# Patient Record
Sex: FEMALE | Race: WHITE | NOT HISPANIC OR LATINO | Employment: FULL TIME | URBAN - METROPOLITAN AREA
[De-identification: names, ages, dates, MRNs, and addresses within clinical notes are randomized per-mention and may not be internally consistent; named-entity substitution may affect disease eponyms.]

---

## 2017-06-28 ENCOUNTER — APPOINTMENT (OUTPATIENT)
Dept: WOMENS IMAGING | Facility: HOSPITAL | Age: 52
End: 2017-06-28

## 2017-06-28 PROCEDURE — 77067 SCR MAMMO BI INCL CAD: CPT | Performed by: RADIOLOGY

## 2017-07-06 ENCOUNTER — TRANSCRIBE ORDERS (OUTPATIENT)
Dept: PHYSICAL THERAPY | Facility: HOSPITAL | Age: 52
End: 2017-07-06

## 2017-07-06 DIAGNOSIS — M25.512 PAIN IN JOINT OF LEFT SHOULDER: Primary | ICD-10-CM

## 2017-07-13 ENCOUNTER — HOSPITAL ENCOUNTER (OUTPATIENT)
Dept: PHYSICAL THERAPY | Facility: HOSPITAL | Age: 52
Setting detail: THERAPIES SERIES
Discharge: HOME | End: 2017-07-13

## 2017-07-13 DIAGNOSIS — G89.29 CHRONIC LEFT SHOULDER PAIN: Primary | ICD-10-CM

## 2017-07-13 DIAGNOSIS — Z78.9 DIFFICULTY WITH ACTIVITIES OF DAILY LIVING: ICD-10-CM

## 2017-07-13 DIAGNOSIS — M25.512 CHRONIC LEFT SHOULDER PAIN: Primary | ICD-10-CM

## 2017-07-13 DIAGNOSIS — M75.02 ADHESIVE CAPSULITIS OF LEFT SHOULDER: ICD-10-CM

## 2017-07-13 PROCEDURE — 97110 THERAPEUTIC EXERCISES: CPT

## 2017-07-13 PROCEDURE — 97161 PT EVAL LOW COMPLEX 20 MIN: CPT

## 2017-07-13 NOTE — THERAPY EVALUATION
Outpatient Physical Therapy Ortho Initial Evaluation  [facility]     Patient Name: Justina Mccarty  : 1965  MRN: [id number]  Today's Date: 2017      Visit Date: 2017    There is no problem list on file for this patient.       Past Medical History:   Diagnosis Date   • Osteopenia         History reviewed. No pertinent surgical history.    Visit Dx:     ICD-10-CM ICD-9-CM   1. Chronic left shoulder pain M25.512 719.41    G89.29 338.29   2. Adhesive capsulitis of left shoulder M75.02 726.0   3. Difficulty with activities of daily living R53.81 799.3             Patient History       17 1700          History    Chief Complaint Difficulty with daily activities;Joint stiffness;Pain;Muscle weakness  -CN      Type of Pain Shoulder pain  -CN      Date Current Problem(s) Began 12/13/15  -CN      Brief Description of Current Complaint Pt reports 1.5 year history of L shoulder pain which began after performing wall push ups at the gym. It has been progressively getting worse, and pt reports difficulty reaching and lifting L UE. Pt using compensatory strategies to complete ADLs such as washing hair, donning/doffiing shirt, making bed, unloading dryer and reaching into cabinets.   -CN      Onset Date- PT 17  -CN      Patient/Caregiver Goals Relieve pain;Return to prior level of function;Improve strength;Know what to do to help the symptoms  -CN      Hand Dominance right-handed  -CN      Pain     Pain Location Shoulder  -CN      Pain at Present 2  -CN      Pain at Best 2  -CN      Pain at Worst 9  -CN      Pain Frequency Constant/continuous  -CN      Pain Description Aching;Sharp  -CN      What Performance Factors Make the Current Problem(s) WORSE? Reaching any direction, WBing activities  -CN      What Performance Factors Make the Current Problem(s) BETTER? Stopping aggrivating activites  -CN      Is your sleep disturbed? Yes   every few hours  -CN      Difficulties at work? 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   -CN      Difficulties with ADL's? Yes, donning shirt, pulling covers, shaving arm pits, washing hair, changing sheets, taking dog for walk, putting dishes away, reaching into dryer for laundry  -CN      Difficulties with recreational activities? Yes, unable to do upper body workouts, water skiing  -CN      Fall Risk Assessment    Any falls in the past year: No  -CN      Daily Activities    Primary Language English  -CN      Are you able to read Yes  -CN      Are you able to write Yes  -CN      How does patient learn best? Reading  -CN      Teaching needs identified Home Exercise Program;Management of Condition  -CN      Pt Participated in POC and Goals Yes  -CN      Safety    Are you being hurt, hit, or frightened by anyone at home or in your life? No  -CN      Are you being neglected by a caregiver No  -CN        User Key  (r) = Recorded By, (t) = Taken By, (c) = Cosigned By    Initials Name Provider Type    CN Devi Michele, PT Physical Therapist                PT Ortho       07/13/17 1800    Posture/Observations    Alignment Options Forward head  -CN    Forward Head Mild  -CN    Posture/Observations Comments Mild downward rotation of L scapula  -CN    Sensation    Sensation WNL? WNL  -CN    Shoulder Girdle Palpation    Shoulder Girdle Palpation? --   Gross tenderness about L shoulder  -CN    Shoulder Girdle Accessory Motions    Posterior glide of humerus --  -CN    Inferior glide of humerus Left:;Hypomobile  -CN    Shoulder Impingement/Rotator Cuff Special Tests    Dubois-Jaxson Test (RC Lesion vs. Bursitis) Negative  -CN    Neer Impingement Test (RC Lesion vs. Bursitis) Left:;Positive  -CN    Full Can Test (RC Lesion) Negative  -CN    Empty Can Test (RC Lesion) Negative  -CN    Left Shoulder    Flexion ROM Details 127/132  -CN    ABduction ROM Details 74/76  -CN    External Rotation ROM Details 31/34  -CN     Internal Rotation ROM Details 68/72  -CN    Right Shoulder    Flexion ROM Details 167/175  -CN    ABduction ROM Details 180  -CN    External Rotation ROM Details 85/91  -CN    Internal Rotation ROM Details 80/86  -CN    Left Shoulder    Flexion Gross Movement (3+/5) fair plus  -CN    ABduction Gross Movement (3+/5) fair plus  -CN    Int Rotation Gross Movement (4/5) good  -CN    Ext Rotation Gross Movement (4/5) good  -CN    Right Shoulder    Flexion Gross Movement (4+/5) good plus  -CN    ABduction Gross Movement (4+/5) good plus  -CN    Int Rotation Gross Movement (4+/5) good plus  -CN    Ext Rotation Gross Movement (4+/5) good plus  -CN    Left Elbow/Forearm    Elbow Flexion Gross Movement (4+/5) good plus  -CN    Elbow Extension Gross Movement (4+/5) good plus  -CN    Right Elbow/Forearm    Elbow Flexion Gross Movement (4+/5) good plus  -CN    Elbow Extension Gross Movement (4+/5) good plus  -CN    Upper Extremity Flexibility    Upper Trapezius Bilateral:;Mildly limited  -CN      User Key  (r) = Recorded By, (t) = Taken By, (c) = Cosigned By    Initials Name Provider Type    NAHID Michele, LAURO Physical Therapist                            Therapy Education       07/13/17 1908          Therapy Education    Given HEP;Symptoms/condition management;Pain management;Posture/body mechanics   Eval findings, anatomy, prognosis, goals of PT  -CN      Program New  -CN      How Provided Verbal;Demonstration;Written  -CN      Provided to Patient  -CN      Level of Understanding Teach back education performed;Verbalized;Demonstrated  -CN        User Key  (r) = Recorded By, (t) = Taken By, (c) = Cosigned By    Initials Name Provider Type    NAHID Micehle PT Physical Therapist                PT OP Goals       07/13/17 1900       PT Short Term Goals    STG Date to Achieve 08/03/17  -CN     STG 1 Pt will report subjective pain at 3/10 or less to demonstrate symptom management with ADLs.   -CN     STG 1  Progress New  -CN     STG 2 Pt will be independent and compliant with HEP in order to facilitate self-management of symptoms.   -CN     STG 2 Progress New  -CN     Long Term Goals    LTG Date to Achieve 08/24/17  -CN     LTG 1 Pt will increase shoulder AROM to flex=155, abd=140, ER=75 and IR=75 on left side in order to perform all ADLs.   -CN     LTG 1 Progress New  -CN     LTG 2 Pt will improve gross shoulder strength to 4+/5 on left side.   -CN     LTG 2 Progress New  -CN     LTG 3 Pt will achieve a DASH score of 15% to demonstrate independence with functional activities.  -CN     LTG 3 Progress New  -CN     Time Calculation    PT Goal Re-Cert Due Date 08/13/17  -CN       User Key  (r) = Recorded By, (t) = Taken By, (c) = Cosigned By    Initials Name Provider Type    NAHID Michele, PT Physical Therapist                PT Assessment/Plan       07/13/17 1912       PT Assessment    Functional Limitations Limitations in functional capacity and performance;Limitation in home management;Performance in self-care ADL;Performance in work activities;Performance in leisure activities  -CN     Impairments Impaired flexibility;Muscle strength;Pain;Posture;Range of motion;Joint mobility  -CN     Assessment Comments Pt is a 52 year old female presenting to therapy with c/o L shoulder pain with insidious onset approximately 1.5 years ago. Pt reports pain with shoulder elevation and with reaching behind back. Pt has difficulty with ADLs including donning/doffing shirts, making bed, reaching into cabinets and unloading dryer and symptoms seem to be worsening. Pt demonstrates slight scapular downward rotation on the L, impaired ROM in all planes (see specific joints) and decreased and painful MMT. Empty end feel with ROM secondary to pain preventing full ROM achieved, however inferior GH mobilization is decreased. Neer impingement test was positive and Dubois leonid, empty can and full can tests were negative. Pt  scored 44% on the DASH where 0% is no disability. Pt's signs and symptoms consistent with L adhesive capsulitis vs impingement. Pt would benefit from skilled PT to address the deficits and return to PLOF.   -CN     Please refer to paper survey for additional self-reported information Yes  -CN     Rehab Potential Good  -CN     Patient/caregiver participated in establishment of treatment plan and goals Yes  -CN     Patient would benefit from skilled therapy intervention Yes  -CN     PT Plan    PT Frequency 2x/week  -CN     Predicted Duration of Therapy Intervention (days/wks) 6 weeks  -CN     Planned CPT's? PT EVAL LOW COMPLEXITY: 26423;PT RE-EVAL: 74990;PT THER PROC EA 15 MIN: 59362;PT THER ACT EA 15 MIN: 81898;PT MANUAL THERAPY EA 15 MIN: 18362;PT NEUROMUSC RE-EDUCATION EA 15 MIN: 79900;PT HOT OR COLD PACK TREAT MCARE;PT ELECTRICAL STIM UNATTEND:   -CN     Physical Therapy Interventions (Optional Details) neuromuscular re-education;stretching;home exercise program;joint mobilization;patient/family education;postural re-education;manual therapy techniques;ROM (Range of Motion);strengthening;modalities  -CN     PT Plan Comments PT to treat 2x/week for 6 weeks consisting of ROM, strengthening and flexibility exercsies. Consider UBE warm up, manual ROM, joint mobilizations and scapular strengthening next visit.   -CN       User Key  (r) = Recorded By, (t) = Taken By, (c) = Cosigned By    Initials Name Provider Type    NAHID Michele, PT Physical Therapist                  Exercises       07/13/17 1900          Exercise 1    Exercise Name 1 Supine shoulder flexion  -CN      Cueing 1 Demo  -CN      Reps 1 10  -CN      Time (Seconds) 1 10  -CN      Exercise 2    Exercise Name 2 Shoulder ER with cane  -CN      Cueing 2 Demo;Tactile  -CN      Reps 2 10  -CN      Time (Seconds) 2 10  -CN      Exercise 3    Exercise Name 3 Wall slides (flexion and abduction)  -CN      Cueing 3 Demo  -CN      Reps 3 10  -CN       Time (Seconds) 3 10  -CN        User Key  (r) = Recorded By, (t) = Taken By, (c) = Cosigned By    Initials Name Provider Type    NAHID Michele PT Physical Therapist                              Outcome Measures       07/13/17 1900          Functional Assessment    Outcome Measure Options Disabilities of the Arm, Shoulder, and Hand (DASH)   44% where 0% is no disability  -CN        User Key  (r) = Recorded By, (t) = Taken By, (c) = Cosigned By    Initials Name Provider Type    NAHID Michele PT Physical Therapist            Time Calculation:   Start Time: 1745  Stop Time: 1843  Time Calculation (min): 58 min     Therapy Charges for Today     Code Description Service Date Service Provider Modifiers Qty    42894934862  PT THER PROC EA 15 MIN 7/13/2017 Devi Michele, PT GP 1    [id number]  PT EVAL LOW COMPLEXITY 3 7/13/2017 Devi Michele, PT GP 1          PT G-Codes  Outcome Measure Options: Disabilities of the Arm, Shoulder, and Hand (DASH) (44% where 0% is no disability)         Devi Michele PT  7/13/2017

## 2017-07-19 ENCOUNTER — HOSPITAL ENCOUNTER (OUTPATIENT)
Dept: PHYSICAL THERAPY | Facility: HOSPITAL | Age: 52
Setting detail: THERAPIES SERIES
Discharge: HOME | End: 2017-07-19

## 2017-07-19 DIAGNOSIS — M75.02 ADHESIVE CAPSULITIS OF LEFT SHOULDER: ICD-10-CM

## 2017-07-19 DIAGNOSIS — G89.29 CHRONIC LEFT SHOULDER PAIN: Primary | ICD-10-CM

## 2017-07-19 DIAGNOSIS — Z78.9 DIFFICULTY WITH ACTIVITIES OF DAILY LIVING: ICD-10-CM

## 2017-07-19 DIAGNOSIS — M25.512 CHRONIC LEFT SHOULDER PAIN: Primary | ICD-10-CM

## 2017-07-19 PROCEDURE — 97140 MANUAL THERAPY 1/> REGIONS: CPT

## 2017-07-19 PROCEDURE — 97110 THERAPEUTIC EXERCISES: CPT

## 2017-07-19 NOTE — THERAPY TREATMENT NOTE
Outpatient Physical Therapy Ortho Treatment Note  [facility]     Patient Name: Justina Mccarty  : 1965  MRN: [id number]  Today's Date: 2017      Visit Date: 2017    Visit Dx:    ICD-10-CM ICD-9-CM   1. Chronic left shoulder pain M25.512 719.41    G89.29 338.29   2. Adhesive capsulitis of left shoulder M75.02 726.0   3. Difficulty with activities of daily living R53.81 799.3       There is no problem list on file for this patient.       Past Medical History:   Diagnosis Date   • Osteopenia         No past surgical history on file.                          PT Assessment/Plan       17 1356       PT Assessment    Assessment Comments Pt returns for follow up after evaluation and reports compliance with HEP. Performed manual stretching today which pt able to tolerate with moderate reports of pain. Added IR stretching today as well as retraction exercises which pt will add to HEP. Pt to continue using L UE for ADLs as tolerated.   -CN     PT Plan    PT Plan Comments Consider resisted IR/ER and pulleys next visit. Continue with UBE warm up and mods prn.  -CN       User Key  (r) = Recorded By, (t) = Taken By, (c) = Cosigned By    Initials Name Provider Type    CN Devi Michele, PT Physical Therapist                    Exercises       17 0700          Subjective Comments    Subjective Comments It feels ok today, I have been doing the exercises, but I'm not sure if I am doing them correctly. My daughter has been moving and we have been helping her. I have had trouble lifting heavy things.   -CN      Subjective Pain    Able to rate subjective pain? yes  -CN      Pre-Treatment Pain Level 0  -CN      Exercise 1    Exercise Name 1 Supine shoulder flexion  -CN      Cueing 1 Demo  -CN      Reps 1 10  -CN      Time (Seconds) 1 10  -CN      Exercise 2    Exercise Name 2 Shoulder ER with cane  -CN      Cueing 2 Demo;Tactile  -CN      Reps 2 10  -CN      Time (Seconds) 2 10  -CN      Exercise 4     Exercise Name 4 Supine ER (behind head stretch)  -CN      Cueing 4 Demo  -CN      Reps 4 10  -CN      Time (Seconds) 4 10  -CN      Exercise 5    Exercise Name 5 Rows  -CN      Cueing 5 Demo  -CN      Resistance 5 Red  -CN      Reps 5 10  -CN      Exercise 6    Exercise Name 6 Shoulder extension  -CN      Cueing 6 Demo  -CN      Resistance 6 Red  -CN      Reps 6 10  -CN      Exercise 7    Exercise Name 7 --  -CN      Exercise 8    Exercise Name 8 IR towel stretch  -CN      Cueing 8 Demo  -CN      Reps 8 10  -CN      Time (Seconds) 8 10  -CN        User Key  (r) = Recorded By, (t) = Taken By, (c) = Cosigned By    Initials Name Provider Type    NAHID Michele PT Physical Therapist                        Manual Rx (last 36 hours)      Manual Treatments       07/19/17 0700          Manual Rx 1    Manual Rx 1 Location PROM and inf and posterior mobilizations to L shoulder  -CN      Manual Rx 1 Duration 20 min  -CN        User Key  (r) = Recorded By, (t) = Taken By, (c) = Cosigned By    Initials Name Provider Type    NAHID Michele PT Physical Therapist                PT OP Goals       07/19/17 0700       PT Short Term Goals    STG Date to Achieve 08/03/17  -CN     STG 1 Pt will report subjective pain at 3/10 or less to demonstrate symptom management with ADLs.   -CN     STG 1 Progress Ongoing  -CN     STG 2 Pt will be independent and compliant with HEP in order to facilitate self-management of symptoms.   -CN     STG 2 Progress Ongoing  -CN     STG 2 Progress Comments Pt reports compliance with HEP.   -CN     Long Term Goals    LTG Date to Achieve 08/24/17  -CN     LTG 1 Pt will increase shoulder AROM to flex=155, abd=140, ER=75 and IR=75 on left side in order to perform all ADLs.   -CN     LTG 1 Progress Ongoing  -CN     LTG 2 Pt will improve gross shoulder strength to 4+/5 on left side.   -CN     LTG 2 Progress Ongoing  -CN     LTG 3 Pt will achieve a DASH score of 15% to demonstrate  independence with functional activities.  -CN     LTG 3 Progress Ongoing  -CN       User Key  (r) = Recorded By, (t) = Taken By, (c) = Cosigned By    Initials Name Provider Type    NAHID Michele PT Physical Therapist                Therapy Education       07/19/17 0749          Therapy Education    Given HEP;Symptoms/condition management;Pain management;Posture/body mechanics  -CN      Program Progressed  -CN      How Provided Verbal;Demonstration;Written  -CN      Provided to Patient  -CN      Level of Understanding Teach back education performed;Verbalized;Demonstrated  -CN        User Key  (r) = Recorded By, (t) = Taken By, (c) = Cosigned By    Initials Name Provider Type    NAHID Michele PT Physical Therapist                Time Calculation:   Start Time: 0730  Stop Time: 0815  Time Calculation (min): 45 min    Therapy Charges for Today     Code Description Service Date Service Provider Modifiers Qty    52232027543  PT THER PROC EA 15 MIN 7/19/2017 Devi Michele PT GP 2    [id number]  PT MANUAL THERAPY EA 15 MIN 7/19/2017 Devi Michele PT GP 1                    Devi Michele PT  7/19/2017

## 2017-07-25 ENCOUNTER — APPOINTMENT (OUTPATIENT)
Dept: PHYSICAL THERAPY | Facility: HOSPITAL | Age: 52
End: 2017-07-25

## 2017-07-27 ENCOUNTER — HOSPITAL ENCOUNTER (OUTPATIENT)
Dept: PHYSICAL THERAPY | Facility: HOSPITAL | Age: 52
Setting detail: THERAPIES SERIES
Discharge: HOME | End: 2017-07-27

## 2017-07-27 DIAGNOSIS — Z78.9 DIFFICULTY WITH ACTIVITIES OF DAILY LIVING: ICD-10-CM

## 2017-07-27 DIAGNOSIS — M25.512 CHRONIC LEFT SHOULDER PAIN: Primary | ICD-10-CM

## 2017-07-27 DIAGNOSIS — M75.02 ADHESIVE CAPSULITIS OF LEFT SHOULDER: ICD-10-CM

## 2017-07-27 DIAGNOSIS — G89.29 CHRONIC LEFT SHOULDER PAIN: Primary | ICD-10-CM

## 2017-07-27 PROCEDURE — 97140 MANUAL THERAPY 1/> REGIONS: CPT

## 2017-07-27 PROCEDURE — 97110 THERAPEUTIC EXERCISES: CPT

## 2017-07-27 PROCEDURE — 97112 NEUROMUSCULAR REEDUCATION: CPT

## 2017-07-27 NOTE — THERAPY TREATMENT NOTE
Outpatient Physical Therapy Ortho Treatment Note  [facility]     Patient Name: Justina Mccarty  : 1965  MRN: [id number]  Today's Date: 2017      Visit Date: 2017    Visit Dx:    ICD-10-CM ICD-9-CM   1. Chronic left shoulder pain M25.512 719.41    G89.29 338.29   2. Adhesive capsulitis of left shoulder M75.02 726.0   3. Difficulty with activities of daily living R53.81 799.3       There is no problem list on file for this patient.       Past Medical History:   Diagnosis Date   • Osteopenia         No past surgical history on file.                          PT Assessment/Plan       17 0930       PT Assessment    Assessment Comments Pt reports some improvement with decreasing intensity of pain with ADL's however still demosntrates limited AROM particularly ER and IR.  She has poor awarenessand control of scapula and will benefit from continued scapular stabilization strengthening.    -JA     PT Plan    PT Plan Comments review ER/IR with tband, may add pulleys for Flex and Abd, warm up with UBE  -DOUGLAS       User Key  (r) = Recorded By, (t) = Taken By, (c) = Cosigned By    Initials Name Provider Type    DOUGLAS Nova, PT Physical Therapist                Modalities       17 0800          Ice    Ice Applied No   pt declined, recommended she use if sore later  -DOUGLAS        User Key  (r) = Recorded By, (t) = Taken By, (c) = Cosigned By    Initials Name Provider Type    DOUGLAS Nova, PT Physical Therapist                Exercises       17 0800          Subjective Comments    Subjective Comments i can use my arm to wash my hair now. I can get it a little bit behind my back.  It is popping some. No pain except with certain movements.  -JA      Subjective Pain    Able to rate subjective pain? yes  -JA      Pre-Treatment Pain Level 0  -JA      Post-Treatment Pain Level 1  -JA      Exercise 3    Exercise Name 3 UBE   L3  -JA      Time (Minutes) 3 4  -JA      Exercise 4     Exercise Name 4 Supine ER (behind head stretch)  -JA      Cueing 4 Demo  -JA      Reps 4 10  -JA      Time (Seconds) 4 10  -JA      Exercise 5    Exercise Name 5 Rows  -JA      Cueing 5 Demo  -JA      Reps 5 10  -JA      Resistance 5 Red  -JA      Exercise 6    Exercise Name 6 Shoulder extension  -JA      Cueing 6 Demo  -JA      Reps 6 10  -JA      Resistance 6 Red  -JA      Exercise 7    Exercise Name 7 UBE   L3  -JA      Reps 7 3  -JA      Exercise 8    Exercise Name 8 IR towel stretch  -JA      Cueing 8 Demo  -JA      Reps 8 10  -JA      Time (Seconds) 8 10  -JA      Exercise 9    Exercise Name 9 Shld ext AROM, then rotate internally and bend elbow  -JA      Time (Minutes) 9 3  -JA      Exercise 10    Exercise Name 10 ER and IR with tband   RED  -JA      Cueing 10 Demo  -JA      Reps 10 10  -JA      Exercise 11    Exercise Name 11 Scap ret with straight elbows  -JA      Cueing 11 Tactile  -JA      Reps 11 10  -JA      Exercise 12    Exercise Name 12 Attempted pec stretch in doorway but due to pain did not continue--pt was unable to feel stretch in targeted tissues  -DOUGLAS        User Key  (r) = Recorded By, (t) = Taken By, (c) = Cosigned By    Initials Name Provider Type    DOUGLAS Nova PT Physical Therapist                        Manual Rx (last 36 hours)      Manual Treatments       07/27/17 0915          Manual Rx 1    Manual Rx 1 Location L shoulder, supine  -      Manual Rx 1 Type PROM and inf and posterior mobilizations to L shoulder   Depressed humerus inferiorly during passive flexion   -      Manual Rx 1 Duration 15 min  -DOUGLAS        User Key  (r) = Recorded By, (t) = Taken By, (c) = Cosigned By    Initials Name Provider Type    DOUGLAS Nova PT Physical Therapist                PT OP Goals       07/27/17 0800       PT Short Term Goals    STG Date to Achieve 08/03/17  -DOUGLAS     STG 1 Pt will report subjective pain at 3/10 or less to demonstrate symptom management with ADLs.   -DOUGLAS     STG  1 Progress Progressing  -DOUGLAS     STG 1 Progress Comments reports pain 2/10 with certain movements  -JA     STG 2 Pt will be independent and compliant with HEP in order to facilitate self-management of symptoms.   -JA     STG 2 Progress Met  -JA     STG 2 Progress Comments pt motivated to perform ex's  -DOUGLAS     Long Term Goals    LTG Date to Achieve 08/24/17  -DOUGLAS     LTG 1 Pt will increase shoulder AROM to flex=155, abd=140, ER=75 and IR=75 on left side in order to perform all ADLs.   -DOUGLAS     LTG 1 Progress Ongoing  -DOUGLAS     LTG 1 Progress Comments nt today  -JA     LTG 2 Pt will improve gross shoulder strength to 4+/5 on left side.   -DOUGLAS     LTG 2 Progress Ongoing  -DOUGLAS     LTG 3 Pt will achieve a DASH score of 15% to demonstrate independence with functional activities.  -DOUGLAS     LTG 3 Progress Ongoing  -DOUGLAS       User Key  (r) = Recorded By, (t) = Taken By, (c) = Cosigned By    Initials Name Provider Type    DOUGLAS Nova PT Physical Therapist                Therapy Education       07/27/17 0800          Therapy Education    Education Details Discussed importance of posture and positioning, particularly the scapula and it's stabilizer muscles.  Used tactile cuing for neuromuscular facilitation of L scap into retraction with some depression.  -DOUGLAS      Given HEP;Pain management;Posture/body mechanics  -DOUGLAS      Program Progressed  -DOUGLAS      How Provided Verbal;Demonstration;Written  -DOUGLAS      Provided to Patient  -DOUGLAS      Level of Understanding Teach back education performed  -DOUGLAS        User Key  (r) = Recorded By, (t) = Taken By, (c) = Cosigned By    Initials Name Provider Type    DOUGLAS Nova PT Physical Therapist                Time Calculation:   Start Time: 0830  Stop Time: 0915  Time Calculation (min): 45 min    Therapy Charges for Today     Code Description Service Date Service Provider Modifiers Qty    30041455664 HC PT THER PROC EA 15 MIN 7/27/2017 Nasra Nova PT GP 1    [id number]  PT  NEUROMUSC RE EDUCATION EA 15 MIN 7/27/2017 Nasra Nova, PT GP 1    [id number]  PT MANUAL THERAPY EA 15 MIN 7/27/2017 Nasra Nova, PT GP 1                    Nasra Nova, PT  7/27/2017

## 2017-07-31 ENCOUNTER — HOSPITAL ENCOUNTER (OUTPATIENT)
Dept: PHYSICAL THERAPY | Facility: HOSPITAL | Age: 52
Setting detail: THERAPIES SERIES
Discharge: HOME | End: 2017-07-31

## 2017-07-31 DIAGNOSIS — M75.02 ADHESIVE CAPSULITIS OF LEFT SHOULDER: ICD-10-CM

## 2017-07-31 DIAGNOSIS — Z78.9 DIFFICULTY WITH ACTIVITIES OF DAILY LIVING: ICD-10-CM

## 2017-07-31 DIAGNOSIS — G89.29 CHRONIC LEFT SHOULDER PAIN: Primary | ICD-10-CM

## 2017-07-31 DIAGNOSIS — M25.512 CHRONIC LEFT SHOULDER PAIN: Primary | ICD-10-CM

## 2017-07-31 PROCEDURE — 97110 THERAPEUTIC EXERCISES: CPT

## 2017-07-31 PROCEDURE — 97140 MANUAL THERAPY 1/> REGIONS: CPT

## 2017-08-02 ENCOUNTER — HOSPITAL ENCOUNTER (OUTPATIENT)
Dept: PHYSICAL THERAPY | Facility: HOSPITAL | Age: 52
Setting detail: THERAPIES SERIES
Discharge: HOME | End: 2017-08-02

## 2017-08-02 DIAGNOSIS — G89.29 CHRONIC LEFT SHOULDER PAIN: Primary | ICD-10-CM

## 2017-08-02 DIAGNOSIS — M75.02 ADHESIVE CAPSULITIS OF LEFT SHOULDER: ICD-10-CM

## 2017-08-02 DIAGNOSIS — M25.512 CHRONIC LEFT SHOULDER PAIN: Primary | ICD-10-CM

## 2017-08-02 DIAGNOSIS — Z78.9 DIFFICULTY WITH ACTIVITIES OF DAILY LIVING: ICD-10-CM

## 2017-08-02 PROCEDURE — 97110 THERAPEUTIC EXERCISES: CPT

## 2017-08-02 PROCEDURE — 97140 MANUAL THERAPY 1/> REGIONS: CPT

## 2017-08-02 NOTE — THERAPY TREATMENT NOTE
Outpatient Physical Therapy Ortho Treatment Note  [facility]     Patient Name: Justina Mccarty  : 1965  MRN: [id number]  Today's Date: 2017      Visit Date: 2017    Visit Dx:    ICD-10-CM ICD-9-CM   1. Chronic left shoulder pain M25.512 719.41    G89.29 338.29   2. Adhesive capsulitis of left shoulder M75.02 726.0   3. Difficulty with activities of daily living R53.81 799.3       There is no problem list on file for this patient.       Past Medical History:   Diagnosis Date   • Osteopenia         No past surgical history on file.                          PT Assessment/Plan       17 1519       PT Assessment    Assessment Comments Pt demonstrates improvements in gross shoulder ROM, however continue with greatest limitations into abduction and ER. Pt going on a trip this weekend and advised to continue with stretches and retraction exercises as able.   -CN     PT Plan    PT Plan Comments Assess tolerance to traveling and progress RTC strengthening as tolerated.   -CN       User Key  (r) = Recorded By, (t) = Taken By, (c) = Cosigned By    Initials Name Provider Type    NAHID Michele, PT Physical Therapist                    Exercises       17 0900          Subjective Comments    Subjective Comments I have been having a little more soreness over the past few days. I can tell its getting better.   -CN      Subjective Pain    Able to rate subjective pain? yes  -CN      Pre-Treatment Pain Level 2  -CN      Post-Treatment Pain Level 2   soreness  -CN      Exercise 1    Exercise Name 1 Pulleys into flexion and abduction  -CN      Cueing 1 Demo  -CN      Reps 1 15  -CN      Time (Seconds) 1 5  -CN      Exercise 2    Exercise Name 2 Shoulder ER with cane  -CN      Cueing 2 Demo;Tactile  -CN      Reps 2 10  -CN      Time (Seconds) 2 10  -CN      Exercise 3    Exercise Name 3 UBE  -CN      Time (Minutes) 3 4  -CN      Exercise 4    Exercise Name 4 Supine ER (behind head stretch)  -CN       Cueing 4 Demo  -CN      Reps 4 10  -CN      Time (Seconds) 4 10  -CN      Exercise 13    Exercise Name 13 Supine punches  -CN      Cueing 13 Demo  -CN      Sets 13 2  -CN      Reps 13 10  -CN      Additional Comments 2#  -CN      Exercise 14    Exercise Name 14 SL abduction  -CN      Cueing 14 Demo  -CN      Reps 14 10  -CN      Additional Comments 1#  -CN      Exercise 15    Exercise Name 15 SL ER  -CN      Cueing 15 Demo  -CN      Sets 15 2  -CN      Reps 15 10  -CN      Additional Comments 1#  -CN        User Key  (r) = Recorded By, (t) = Taken By, (c) = Cosigned By    Initials Name Provider Type    NAHID Michele PT Physical Therapist                        Manual Rx (last 36 hours)      Manual Treatments       08/02/17 0900          Manual Rx 1    Manual Rx 1 Location L shoulder, supine  -CN      Manual Rx 1 Type PROM and inf and posterior mobilizations to L shoulder  -CN      Manual Rx 1 Duration 15 min  -CN        User Key  (r) = Recorded By, (t) = Taken By, (c) = Cosigned By    Initials Name Provider Type    NAHID Michele PT Physical Therapist                    Therapy Education       08/02/17 0933          Therapy Education    Given HEP;Pain management;Posture/body mechanics  -CN      Program Progressed  -CN      How Provided Verbal;Demonstration  -CN      Provided to Patient  -CN      Level of Understanding Teach back education performed  -CN        User Key  (r) = Recorded By, (t) = Taken By, (c) = Cosigned By    Initials Name Provider Type    NAHID Michele PT Physical Therapist                Time Calculation:   Start Time: 0900  Stop Time: 0945  Time Calculation (min): 45 min    Therapy Charges for Today     Code Description Service Date Service Provider Modifiers Qty    33484220817  PT THER PROC EA 15 MIN 8/2/2017 Devi Michele PT GP 2    30611985175 HC PT MANUAL THERAPY EA 15 MIN 8/2/2017 Devi Michele PT GP 1                    Devi  Mike Michele, PT  8/2/2017

## 2017-08-08 ENCOUNTER — HOSPITAL ENCOUNTER (OUTPATIENT)
Dept: PHYSICAL THERAPY | Facility: HOSPITAL | Age: 52
Setting detail: THERAPIES SERIES
Discharge: HOME | End: 2017-08-08

## 2017-08-08 DIAGNOSIS — Z78.9 DIFFICULTY WITH ACTIVITIES OF DAILY LIVING: ICD-10-CM

## 2017-08-08 DIAGNOSIS — M75.02 ADHESIVE CAPSULITIS OF LEFT SHOULDER: ICD-10-CM

## 2017-08-08 DIAGNOSIS — M25.512 CHRONIC LEFT SHOULDER PAIN: Primary | ICD-10-CM

## 2017-08-08 DIAGNOSIS — G89.29 CHRONIC LEFT SHOULDER PAIN: Primary | ICD-10-CM

## 2017-08-08 PROCEDURE — 97110 THERAPEUTIC EXERCISES: CPT

## 2017-08-08 PROCEDURE — 97140 MANUAL THERAPY 1/> REGIONS: CPT

## 2017-08-08 NOTE — THERAPY TREATMENT NOTE
Outpatient Physical Therapy Ortho Treatment Note  [facility]     Patient Name: Justina Mccarty  : 1965  MRN: [id number]  Today's Date: 2017      Visit Date: 2017    Visit Dx:    ICD-10-CM ICD-9-CM   1. Chronic left shoulder pain M25.512 719.41    G89.29 338.29   2. Adhesive capsulitis of left shoulder M75.02 726.0   3. Difficulty with activities of daily living R53.81 799.3       There is no problem list on file for this patient.       Past Medical History:   Diagnosis Date   • Osteopenia         No past surgical history on file.                          PT Assessment/Plan       17 1409       PT Assessment    Assessment Comments Pt continues to have difficulty with abduction and ER ROM, both passive and active. Added rhythmic stabilization today to increase scapular stabilizers and pt demonstrates fatigue after 30 sec x2 with rest break. Pt's shoulder musculature endurance is impaired and advised to continue with HEP to improve strength and stamina.   -CN     PT Plan    PT Plan Comments Continue to progress stabilization activities as tolerated.   -CN       User Key  (r) = Recorded By, (t) = Taken By, (c) = Cosigned By    Initials Name Provider Type    CN Devi Michele, PT Physical Therapist                    Exercises       17 0800          Subjective Comments    Subjective Comments I tried to do my exercises over the weekend but I didn't do them every day. I noticed on the days I didn't do them that I didn't sleep as well.   -CN      Subjective Pain    Able to rate subjective pain? yes  -CN      Pre-Treatment Pain Level 1  -CN      Exercise 1    Exercise Name 1 Pulleys into flexion and abduction  -CN      Cueing 1 Demo  -CN      Reps 1 15  -CN      Time (Seconds) 1 5  -CN      Exercise 2    Exercise Name 2 Shoulder ER with cane  -CN      Cueing 2 Demo;Tactile  -CN      Reps 2 10  -CN      Time (Seconds) 2 10  -CN      Exercise 3    Exercise Name 3 UBE  -CN      Time  (Minutes) 3 4  -CN      Exercise 4    Exercise Name 4 Supine ER (behind head stretch)  -CN      Cueing 4 Demo  -CN      Reps 4 10  -CN      Time (Seconds) 4 10  -CN      Exercise 13    Exercise Name 13 Supine punches  -CN      Cueing 13 Demo  -CN      Sets 13 2  -CN      Reps 13 10  -CN      Additional Comments 2#  -CN      Exercise 14    Exercise Name 14 SL abduction  -CN      Cueing 14 Demo  -CN      Sets 14 2  -CN      Reps 14 10  -CN      Additional Comments 1#  -CN      Exercise 15    Exercise Name 15 SL ER  -CN      Cueing 15 Demo  -CN      Sets 15 2  -CN      Reps 15 10  -CN      Additional Comments 1#  -CN        User Key  (r) = Recorded By, (t) = Taken By, (c) = Cosigned By    Initials Name Provider Type    NAHID Michele, PT Physical Therapist                        Manual Rx (last 36 hours)      Manual Treatments       08/08/17 0700          Manual Rx 1    Manual Rx 1 Location L shoulder, supine  -CN      Manual Rx 1 Type PROM and inf and posterior mobilizations to L shoulder  -CN      Manual Rx 2    Manual Rx 2 Location Rhythmic stabilization, pt in HL  -CN      Manual Rx 2 Duration 20 min  -CN        User Key  (r) = Recorded By, (t) = Taken By, (c) = Cosigned By    Initials Name Provider Type    NAHID Michele PT Physical Therapist                PT OP Goals       08/08/17 0900       PT Short Term Goals    STG Date to Achieve 08/03/17  -CN     STG 1 Pt will report subjective pain at 3/10 or less to demonstrate symptom management with ADLs.   -CN     STG 1 Progress Progressing  -CN     STG 1 Progress Comments Up to a 4/10 with exercises.   -CN     STG 2 Pt will be independent and compliant with HEP in order to facilitate self-management of symptoms.   -CN     STG 2 Progress Met  -CN     Long Term Goals    LTG Date to Achieve 08/24/17  -CN     LTG 1 Pt will increase shoulder AROM to flex=155, abd=140, ER=75 and IR=75 on left side in order to perform all ADLs.   -CN     LTG 1  Progress Ongoing  -CN     LTG 2 Pt will improve gross shoulder strength to 4+/5 on left side.   -CN     LTG 2 Progress Ongoing  -CN     LTG 3 Pt will achieve a DASH score of 15% to demonstrate independence with functional activities.  -CN     LTG 3 Progress Ongoing  -CN       User Key  (r) = Recorded By, (t) = Taken By, (c) = Cosigned By    Initials Name Provider Type    NAHID Michele PT Physical Therapist                Therapy Education       08/08/17 0859          Therapy Education    Given HEP;Pain management;Posture/body mechanics  -CN      Program Progressed  -CN      How Provided Verbal;Demonstration  -CN      Provided to Patient  -CN      Level of Understanding Teach back education performed  -CN        User Key  (r) = Recorded By, (t) = Taken By, (c) = Cosigned By    Initials Name Provider Type    NAHID Michele PT Physical Therapist                Time Calculation:   Start Time: 0830  Stop Time: 0915  Time Calculation (min): 45 min    Therapy Charges for Today     Code Description Service Date Service Provider Modifiers Qty    39994080020  PT THER PROC EA 15 MIN 8/8/2017 Devi Michele PT GP 2    [id number]  PT MANUAL THERAPY EA 15 MIN 8/8/2017 Devi Michele PT GP 1                    Devi Michele PT  8/8/2017

## 2017-08-10 ENCOUNTER — HOSPITAL ENCOUNTER (OUTPATIENT)
Dept: PHYSICAL THERAPY | Facility: HOSPITAL | Age: 52
Setting detail: THERAPIES SERIES
Discharge: HOME | End: 2017-08-10

## 2017-08-10 DIAGNOSIS — M25.512 CHRONIC LEFT SHOULDER PAIN: Primary | ICD-10-CM

## 2017-08-10 DIAGNOSIS — G89.29 CHRONIC LEFT SHOULDER PAIN: Primary | ICD-10-CM

## 2017-08-10 DIAGNOSIS — M75.02 ADHESIVE CAPSULITIS OF LEFT SHOULDER: ICD-10-CM

## 2017-08-10 DIAGNOSIS — Z78.9 DIFFICULTY WITH ACTIVITIES OF DAILY LIVING: ICD-10-CM

## 2017-08-10 PROCEDURE — 97110 THERAPEUTIC EXERCISES: CPT

## 2017-08-10 PROCEDURE — 97140 MANUAL THERAPY 1/> REGIONS: CPT

## 2017-08-10 NOTE — THERAPY TREATMENT NOTE
Outpatient Physical Therapy Ortho Treatment Note  [facility]     Patient Name: Justina Mccarty  : 1965  MRN: [id number]  Today's Date: 8/10/2017      Visit Date: 08/10/2017    Visit Dx:    ICD-10-CM ICD-9-CM   1. Chronic left shoulder pain M25.512 719.41    G89.29 338.29   2. Adhesive capsulitis of left shoulder M75.02 726.0   3. Difficulty with activities of daily living R53.81 799.3       There is no problem list on file for this patient.       Past Medical History:   Diagnosis Date   • Osteopenia         No past surgical history on file.                          PT Assessment/Plan       08/10/17 1917       PT Assessment    Assessment Comments Pt reports stiffness today due to trip earlier this week. ROM continues to improve and added supine ABCs and ball rolls on wall which pt was able to tolerate with mild soreness. Pt to continue using L UE for ADLs as tolerated, however keeping pain at manageable level.   -CN     PT Plan    PT Plan Comments Reeval next visit. Continue with current POC.   -CN       User Key  (r) = Recorded By, (t) = Taken By, (c) = Cosigned By    Initials Name Provider Type    CN Devi Michele, PT Physical Therapist                    Exercises       08/10/17 1500          Subjective Comments    Subjective Comments It is aching pretty bad today. [facility] [place] on Tuesday and it was hurting in the car.   -CN      Subjective Pain    Able to rate subjective pain? yes  -CN      Pre-Treatment Pain Level 2  -CN      Post-Treatment Pain Level 3  -CN      Exercise 1    Exercise Name 1 Pulleys into flexion and abduction  -CN      Cueing 1 Demo  -CN      Reps 1 15  -CN      Time (Seconds) 1 5  -CN      Exercise 3    Exercise Name 3 UBE  -CN      Time (Minutes) 3 4  -CN      Exercise 7    Exercise Name 7 UBE  -CN      Reps 7 4  -CN      Exercise 8    Exercise Name 8 IR towel stretch  -CN      Cueing 8 Demo  -CN      Reps 8 10  -CN      Time (Seconds) 8 10  -CN      Exercise  12    Exercise Name 12 Supine ABCs  -CN      Cueing 12 Demo  -CN      Reps 12 1  -CN      Additional Comments 2#  -CN      Exercise 16    Exercise Name 16 Wall slides into flexion and abduction  -CN      Cueing 16 Demo  -CN      Reps 16 10  -CN      Time (Seconds) 16 10  -CN      Exercise 17    Exercise Name 17 Ball rolls on wall with punch  -CN      Cueing 17 Demo  -CN      Reps 17 10  -CN      Additional Comments 3# ball  -CN        User Key  (r) = Recorded By, (t) = Taken By, (c) = Cosigned By    Initials Name Provider Type    NAHID Michele PT Physical Therapist                        Manual Rx (last 36 hours)      Manual Treatments       08/10/17 1500          Manual Rx 1    Manual Rx 1 Location L shoulder, supine  -CN      Manual Rx 1 Type PROM and inf and posterior mobilizations to L shoulder  -CN      Manual Rx 2    Manual Rx 2 Location Rhythmic stabilization, pt in HL  -CN      Manual Rx 2 Duration 20 min  -CN        User Key  (r) = Recorded By, (t) = Taken By, (c) = Cosigned By    Initials Name Provider Type    NAHID Michele PT Physical Therapist                    Therapy Education       08/10/17 1540          Therapy Education    Given HEP;Pain management;Posture/body mechanics  -CN      Program Progressed  -CN      How Provided Verbal;Demonstration  -CN      Provided to Patient  -CN      Level of Understanding Teach back education performed  -CN        User Key  (r) = Recorded By, (t) = Taken By, (c) = Cosigned By    Initials Name Provider Type    NAHID Michele PT Physical Therapist                Time Calculation:   Start Time: 1530  Stop Time: 1615  Time Calculation (min): 45 min    Therapy Charges for Today     Code Description Service Date Service Provider Modifiers Qty    06496966543  PT THER PROC EA 15 MIN 8/10/2017 Devi Michele PT GP 2    94993582873 HC PT MANUAL THERAPY EA 15 MIN 8/10/2017 Devi Michele PT GP 1                     Devi Michele, PT  8/10/2017

## 2017-08-15 ENCOUNTER — HOSPITAL ENCOUNTER (OUTPATIENT)
Dept: PHYSICAL THERAPY | Facility: HOSPITAL | Age: 52
Setting detail: THERAPIES SERIES
Discharge: HOME | End: 2017-08-15

## 2017-08-15 DIAGNOSIS — M75.02 ADHESIVE CAPSULITIS OF LEFT SHOULDER: ICD-10-CM

## 2017-08-15 DIAGNOSIS — G89.29 CHRONIC LEFT SHOULDER PAIN: Primary | ICD-10-CM

## 2017-08-15 DIAGNOSIS — Z78.9 DIFFICULTY WITH ACTIVITIES OF DAILY LIVING: ICD-10-CM

## 2017-08-15 DIAGNOSIS — M25.512 CHRONIC LEFT SHOULDER PAIN: Primary | ICD-10-CM

## 2017-08-15 PROCEDURE — 97140 MANUAL THERAPY 1/> REGIONS: CPT

## 2017-08-15 PROCEDURE — 97110 THERAPEUTIC EXERCISES: CPT

## 2017-08-15 NOTE — THERAPY PROGRESS REPORT/RE-CERT
Outpatient Physical Therapy Ortho Re-Assessment  [facility]     Patient Name: Justina Mccarty  : 1965  MRN: [id number]  Today's Date: 8/15/2017      Visit Date: 08/15/2017    There is no problem list on file for this patient.       Past Medical History:   Diagnosis Date   • Osteopenia         No past surgical history on file.    Visit Dx:     ICD-10-CM ICD-9-CM   1. Chronic left shoulder pain M25.512 719.41    G89.29 338.29   2. Adhesive capsulitis of left shoulder M75.02 726.0   3. Difficulty with activities of daily living R53.81 799.3                 PT Ortho       08/15/17 1700    Left Shoulder    Flexion ROM Details 136/139  -CN    ABduction ROM Details 94/98  -CN    External Rotation ROM Details 43/46  -CN    Internal Rotation ROM Details 68/73  -CN    Left Shoulder    Flexion Gross Movement (4/5) good  -CN    ABduction Gross Movement (4/5) good  -CN    Int Rotation Gross Movement (4/5) good  -CN    Ext Rotation Gross Movement (4/5) good  -CN    Right Shoulder    Flexion Gross Movement (4+/5) good plus  -CN    ABduction Gross Movement (4+/5) good plus  -CN    Int Rotation Gross Movement (4+/5) good plus  -CN    Ext Rotation Gross Movement (4+/5) good plus  -CN      User Key  (r) = Recorded By, (t) = Taken By, (c) = Cosigned By    Initials Name Provider Type    NAHID Michele, PT Physical Therapist                            Therapy Education       08/15/17 1750          Therapy Education    Given HEP;Pain management;Posture/body mechanics  -CN      Program Reinforced  -CN      How Provided Verbal;Demonstration  -CN      Provided to Patient  -CN      Level of Understanding Teach back education performed  -CN        User Key  (r) = Recorded By, (t) = Taken By, (c) = Cosigned By    Initials Name Provider Type    NAHID Michele, PT Physical Therapist                PT OP Goals       08/15/17 1700       PT Short Term Goals    STG Date to Achieve 17  -CN     STG 1 Pt will  report subjective pain at 3/10 or less to demonstrate symptom management with ADLs.   -CN     STG 1 Progress Progressing  -CN     STG 1 Progress Comments Pain reaches 3-4/10 with certain movements.   -CN     STG 2 Pt will be independent and compliant with HEP in order to facilitate self-management of symptoms.   -CN     STG 2 Progress Met  -CN     Long Term Goals    LTG Date to Achieve 08/24/17  -CN     LTG 1 Pt will increase shoulder AROM to flex=155, abd=140, ER=75 and IR=75 on left side in order to perform all ADLs.   -CN     LTG 1 Progress Progressing  -CN     LTG 1 Progress Comments Pt's ROM improving, however continues with limitations most significantly into abduction and ER.   -CN     LTG 2 Pt will improve gross shoulder strength to 4+/5 on left side.   -CN     LTG 2 Progress Progressing  -CN     LTG 2 Progress Comments Pt's strength rated 4/5 on the L.   -CN     LTG 3 Pt will achieve a DASH score of 15% to demonstrate independence with functional activities.  -CN     LTG 3 Progress Progressing  -CN     LTG 3 Progress Comments Pt scored 39% on the DASH where 0% is no disability.   -CN       User Key  (r) = Recorded By, (t) = Taken By, (c) = Cosigned By    Initials Name Provider Type    NAHID Michele, PT Physical Therapist                PT Assessment/Plan       08/15/17 5894       PT Assessment    Functional Limitations Limitations in functional capacity and performance;Limitation in home management;Performance in self-care ADL;Performance in work activities;Performance in leisure activities  -CN     Impairments Impaired flexibility;Muscle strength;Pain;Posture;Range of motion;Joint mobility  -CN     Assessment Comments Pt has attended 8 skilled therapy sessions for treatment of L shoulder pain. Pt has made progress toward goals, specifically with regard to increased ROM and strength in L shoulder, however deficits still remain in both areas. Pt notes improvement in ROM with functional activities  such as reaching into dryer to gather clothes and carry groceries. Pt continues to have difficulty donning coat and fastening seat belt and requires slow, controlled movements to decrease exacerbation of shoulder. Pt scored 39% on the DASH where 0% is no disability. Pt would benefit from continued skilled PT to address remaining deficits and return to PLOF.   -CN     Please refer to paper survey for additional self-reported information Yes  -CN     Rehab Potential Good  -CN     Patient/caregiver participated in establishment of treatment plan and goals Yes  -CN     Patient would benefit from skilled therapy intervention Yes  -CN     PT Plan    PT Frequency 2x/week  -CN     Predicted Duration of Therapy Intervention (days/wks) 4 weeks  -CN     Planned CPT's? PT RE-EVAL: 68920;PT THER PROC EA 15 MIN: 49763;PT THER ACT EA 15 MIN: 46928;PT MANUAL THERAPY EA 15 MIN: 39082;PT NEUROMUSC RE-EDUCATION EA 15 MIN: 41733;PT GAIT TRAINING EA 15 MIN: 96075;PT HOT OR COLD PACK TREAT MCARE;PT ELECTRICAL STIM UNATTEND: ;PT ULTRASOUND EA 15 MIN: 07178  -CN     Physical Therapy Interventions (Optional Details) neuromuscular re-education;stretching;home exercise program;joint mobilization;patient/family education;postural re-education;manual therapy techniques;ROM (Range of Motion);modalities;strengthening  -CN     PT Plan Comments PT to continue treating 2x/week for 4 weeks consisting of ROM, strengthening and stability exercises. Consider prone Ys and Ts next visit.   -CN       User Key  (r) = Recorded By, (t) = Taken By, (c) = Cosigned By    Initials Name Provider Type    CN Devi Michele, PT Physical Therapist                  Exercises       08/15/17 1700          Subjective Comments    Subjective Comments It was stiff yesterday but felt a lot better after I stretched it.   -CN      Subjective Pain    Able to rate subjective pain? yes  -CN      Pre-Treatment Pain Level 2  -CN      Exercise 3    Exercise Name 3 UBE   -CN      Time (Minutes) 3 4  -CN      Exercise 8    Exercise Name 8 IR towel stretch  -CN      Cueing 8 Demo  -CN      Reps 8 10  -CN      Time (Seconds) 8 10  -CN      Exercise 12    Exercise Name 12 Supine ABCs  -CN      Cueing 12 Demo  -CN      Reps 12 1  -CN      Additional Comments 2#  -CN      Exercise 13    Exercise Name 13 Supine punches  -CN      Cueing 13 Demo  -CN      Sets 13 2  -CN      Reps 13 10  -CN        User Key  (r) = Recorded By, (t) = Taken By, (c) = Cosigned By    Initials Name Provider Type    NAHID Michele PT Physical Therapist           Manual Rx (last 36 hours)      Manual Treatments       08/15/17 1700          Manual Rx 1    Manual Rx 1 Location L shoulder, supine  -CN      Manual Rx 1 Type PROM and inf and posterior mobilizations to L shoulder  -CN      Manual Rx 2    Manual Rx 2 Location Rhythmic stabilization, pt in HL  -CN      Manual Rx 2 Duration 20 min  -CN        User Key  (r) = Recorded By, (t) = Taken By, (c) = Cosigned By    Initials Name Provider Type    NAHID Michele PT Physical Therapist                            Outcome Measures       08/15/17 1900          Functional Assessment    Outcome Measure Options Disabilities of the Arm, Shoulder, and Hand (DASH)   39% where 0% is no disability  -CN        User Key  (r) = Recorded By, (t) = Taken By, (c) = Cosigned By    Initials Name Provider Type    NAHID Michele PT Physical Therapist            Time Calculation:   Start Time: 1715  Stop Time: 1800  Time Calculation (min): 45 min     Therapy Charges for Today     Code Description Service Date Service Provider Modifiers Qty    31686839664 HC PT THER PROC EA 15 MIN 8/15/2017 Devi Michele, PT GP 2    78535925909 HC PT MANUAL THERAPY EA 15 MIN 8/15/2017 Devi Michele, PT GP 1          PT G-Codes  Outcome Measure Options: Disabilities of the Arm, Shoulder, and Hand (DASH) (39% where 0% is no disability)         Devi Mckeon  Leny, PT  8/15/2017

## 2017-08-17 ENCOUNTER — HOSPITAL ENCOUNTER (OUTPATIENT)
Dept: PHYSICAL THERAPY | Facility: HOSPITAL | Age: 52
Setting detail: THERAPIES SERIES
Discharge: HOME | End: 2017-08-17

## 2017-08-17 DIAGNOSIS — M75.02 ADHESIVE CAPSULITIS OF LEFT SHOULDER: ICD-10-CM

## 2017-08-17 DIAGNOSIS — Z78.9 DIFFICULTY WITH ACTIVITIES OF DAILY LIVING: ICD-10-CM

## 2017-08-17 DIAGNOSIS — G89.29 CHRONIC LEFT SHOULDER PAIN: Primary | ICD-10-CM

## 2017-08-17 DIAGNOSIS — M25.512 CHRONIC LEFT SHOULDER PAIN: Primary | ICD-10-CM

## 2017-08-17 PROCEDURE — 97110 THERAPEUTIC EXERCISES: CPT

## 2017-08-17 PROCEDURE — 97140 MANUAL THERAPY 1/> REGIONS: CPT

## 2017-08-17 NOTE — THERAPY TREATMENT NOTE
Outpatient Physical Therapy Ortho Treatment Note  [facility]     Patient Name: Justina Mccarty  : 1965  MRN: [id number]  Today's Date: 2017      Visit Date: 2017    Visit Dx:    ICD-10-CM ICD-9-CM   1. Chronic left shoulder pain M25.512 719.41    G89.29 338.29   2. Adhesive capsulitis of left shoulder M75.02 726.0   3. Difficulty with activities of daily living R53.81 799.3       There is no problem list on file for this patient.       Past Medical History:   Diagnosis Date   • Osteopenia         No past surgical history on file.          PT Ortho       08/15/17 1700    Left Shoulder    Flexion ROM Details 136/139  -CN    ABduction ROM Details 94/98  -CN    External Rotation ROM Details 43/46  -CN    Internal Rotation ROM Details 68/73  -CN    Left Shoulder    Flexion Gross Movement (4/5) good  -CN    ABduction Gross Movement (4/5) good  -CN    Int Rotation Gross Movement (4/5) good  -CN    Ext Rotation Gross Movement (4/5) good  -CN    Right Shoulder    Flexion Gross Movement (4+/5) good plus  -CN    ABduction Gross Movement (4+/5) good plus  -CN    Int Rotation Gross Movement (4+/5) good plus  -CN    Ext Rotation Gross Movement (4+/5) good plus  -CN      User Key  (r) = Recorded By, (t) = Taken By, (c) = Cosigned By    Initials Name Provider Type    NAHID Michele, PT Physical Therapist                            PT Assessment/Plan       17 1700       PT Assessment    Assessment Comments Pt demonstrates significant weakness in retractors with prone exercises and updated handouts to include in home program. Middle and lower trap muscles weakness impair shoulder stability with elevatioin and likely causing symptoms of impingement.   -CN     PT Plan    PT Plan Comments Assess response to prone exercsies and continue with scapular stabilization exercises.   -CN       User Key  (r) = Recorded By, (t) = Taken By, (c) = Cosigned By    Initials Name Provider Type    NAHID Mckeon  Leny, LAURO Physical Therapist                    Exercises       08/17/17 1600          Subjective Comments    Subjective Comments It isn't really hurting today, just a little sore. 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   -CN      Subjective Pain    Able to rate subjective pain? yes  -CN      Pre-Treatment Pain Level 0  -CN      Exercise 1    Exercise Name 1 Pulleys into flexion and abduction  -CN      Cueing 1 Demo  -CN      Reps 1 15  -CN      Time (Seconds) 1 5  -CN      Exercise 2    Exercise Name 2 Supine flexion with cane  -CN      Cueing 2 Demo  -CN      Reps 2 10  -CN      Additional Comments 2#  -CN      Exercise 3    Exercise Name 3 UBE  -CN      Time (Minutes) 3 4  -CN      Exercise 5    Exercise Name 5 Supine press ups  -CN      Cueing 5 Demo  -CN      Sets 5 2  -CN      Reps 5 10  -CN      Additional Comments 2#  -CN      Exercise 6    Exercise Name 6 Prone shoulder extension  -CN      Cueing 6 Demo  -CN      Reps 6 10  -CN      Exercise 7    Exercise Name 7 Prone Ys and Ts  -CN      Cueing 7 Demo  -CN      Sets 7 2  -CN      Reps 7 10  -CN      Exercise 9    Exercise Name 9 Prone rows  -CN      Cueing 9 Demo  -CN      Reps 9 10  -CN      Exercise 12    Exercise Name 12 Supine ABCs  -CN      Cueing 12 Demo  -CN      Reps 12 1  -CN      Additional Comments 2#  -CN        User Key  (r) = Recorded By, (t) = Taken By, (c) = Cosigned By    Initials Name Provider Type    CN Devi Michele, LAURO Physical Therapist                        Manual Rx (last 36 hours)      Manual Treatments       08/17/17 1500          Manual Rx 1    Manual Rx 1 Location L shoulder, supine  -CN      Manual Rx 1 Type PROM and inf and posterior mobilizations to L shoulder  -CN      Manual Rx 1 Duration 15 min  -CN        User Key  (r) = Recorded By, (t) = Taken By, (c) = Cosigned By    Initials Name Provider Type    NAHID Michele PT Physical Therapist                     Therapy Education       08/17/17 1649          Therapy Education    Given HEP;Pain management;Posture/body mechanics  -CN      Program Reinforced  -CN      How Provided Verbal;Demonstration  -CN      Provided to Patient  -CN      Level of Understanding Teach back education performed  -CN        User Key  (r) = Recorded By, (t) = Taken By, (c) = Cosigned By    Initials Name Provider Type    NAHID Michele, LAURO Physical Therapist                Outcome Measures       08/15/17 1900          Functional Assessment    Outcome Measure Options Disabilities of the Arm, Shoulder, and Hand (DASH)   39% where 0% is no disability  -CN        User Key  (r) = Recorded By, (t) = Taken By, (c) = Cosigned By    Initials Name Provider Type    NAHID Michele PT Physical Therapist            Time Calculation:   Start Time: 1615  Stop Time: 1700  Time Calculation (min): 45 min    Therapy Charges for Today     Code Description Service Date Service Provider Modifiers Qty    70579652866 HC PT THER PROC EA 15 MIN 8/17/2017 Devi Michele, PT GP 2    [id number]  PT MANUAL THERAPY EA 15 MIN 8/17/2017 Devi Michele PT GP 1                    Devi Michele PT  8/17/2017

## 2017-08-21 ENCOUNTER — HOSPITAL ENCOUNTER (OUTPATIENT)
Dept: PHYSICAL THERAPY | Facility: HOSPITAL | Age: 52
Setting detail: THERAPIES SERIES
Discharge: HOME | End: 2017-08-21

## 2017-08-21 DIAGNOSIS — G89.29 CHRONIC LEFT SHOULDER PAIN: Primary | ICD-10-CM

## 2017-08-21 DIAGNOSIS — M25.512 CHRONIC LEFT SHOULDER PAIN: Primary | ICD-10-CM

## 2017-08-21 DIAGNOSIS — Z78.9 DIFFICULTY WITH ACTIVITIES OF DAILY LIVING: ICD-10-CM

## 2017-08-21 DIAGNOSIS — M75.02 ADHESIVE CAPSULITIS OF LEFT SHOULDER: ICD-10-CM

## 2017-08-21 PROCEDURE — 97140 MANUAL THERAPY 1/> REGIONS: CPT

## 2017-08-21 PROCEDURE — 97110 THERAPEUTIC EXERCISES: CPT

## 2017-08-21 NOTE — THERAPY TREATMENT NOTE
Outpatient Physical Therapy Ortho Treatment Note  [facility]     Patient Name: Justina Mccarty  : 1965  MRN: [id number]  Today's Date: 2017      Visit Date: 2017    Visit Dx:    ICD-10-CM ICD-9-CM   1. Chronic left shoulder pain M25.512 719.41    G89.29 338.29   2. Adhesive capsulitis of left shoulder M75.02 726.0   3. Difficulty with activities of daily living R53.81 799.3       There is no problem list on file for this patient.       Past Medical History:   Diagnosis Date   • Osteopenia         No past surgical history on file.                          PT Assessment/Plan       17 5358       PT Assessment    Assessment Comments Continued with strengthening of scapular stabilizers today and increased weight to pt's tolerance. Pt demonstrates increased upward rotation and protraction with shoulder elevation and would benefit from continued scapular stabilization exercises.   -CN     PT Plan    PT Plan Comments Assess tolerance to added weight and continue to progress strengthening as tolerated.   -CN       User Key  (r) = Recorded By, (t) = Taken By, (c) = Cosigned By    Initials Name Provider Type    CN Devi Michele, PT Physical Therapist                    Exercises       17 1700          Subjective Comments    Subjective Comments It is a little sore today, but we moved my son into college this weekend so I was lifting a lot.   -CN      Subjective Pain    Able to rate subjective pain? yes  -CN      Pre-Treatment Pain Level 2  -CN      Exercise 1    Exercise Name 1 Pulleys into flexion and abduction  -CN      Cueing 1 Demo  -CN      Reps 1 15  -CN      Time (Seconds) 1 5  -CN      Exercise 2    Exercise Name 2 Supine flexion with cane  -CN      Cueing 2 Demo  -CN      Reps 2 10  -CN      Additional Comments 2#  -CN      Exercise 3    Exercise Name 3 UBE  -CN      Time (Minutes) 3 4  -CN      Exercise 5    Exercise Name 5 Supine press ups  -CN      Cueing 5 Demo  -CN       Sets 5 2  -CN      Reps 5 10  -CN      Additional Comments 2#  -CN      Exercise 6    Exercise Name 6 Prone shoulder extension  -CN      Cueing 6 Demo  -CN      Reps 6 10  -CN      Additional Comments 2#  -CN      Exercise 7    Exercise Name 7 Prone Ys and Ts  -CN      Cueing 7 Demo  -CN      Sets 7 2  -CN      Reps 7 10  -CN      Exercise 9    Exercise Name 9 Prone rows  -CN      Cueing 9 Demo  -CN      Reps 9 10  -CN      Additional Comments 2#  -CN      Exercise 12    Exercise Name 12 Supine ABCs  -CN      Cueing 12 Demo  -CN      Reps 12 1  -CN      Additional Comments 3#  -CN      Exercise 13    Exercise Name 13 Supine punches  -CN      Cueing 13 Demo  -CN      Sets 13 2  -CN      Reps 13 10  -CN      Additional Comments 3#  -CN        User Key  (r) = Recorded By, (t) = Taken By, (c) = Cosigned By    Initials Name Provider Type    NAHID Michele, PT Physical Therapist                        Manual Rx (last 36 hours)      Manual Treatments       08/21/17 1700          Manual Rx 1    Manual Rx 1 Location L shoulder, supine  -CN      Manual Rx 1 Type PROM and inf and posterior mobilizations to L shoulder  -CN      Manual Rx 1 Duration 15 min  -CN        User Key  (r) = Recorded By, (t) = Taken By, (c) = Cosigned By    Initials Name Provider Type    NAHID Michele PT Physical Therapist                PT OP Goals       08/21/17 1700       PT Short Term Goals    STG Date to Achieve 08/03/17  -CN     STG 1 Pt will report subjective pain at 3/10 or less to demonstrate symptom management with ADLs.   -CN     STG 1 Progress Progressing  -CN     STG 2 Pt will be independent and compliant with HEP in order to facilitate self-management of symptoms.   -CN     STG 2 Progress Met  -CN     Long Term Goals    LTG Date to Achieve 08/24/17  -CN     LTG 1 Pt will increase shoulder AROM to flex=155, abd=140, ER=75 and IR=75 on left side in order to perform all ADLs.   -CN     LTG 1 Progress Progressing  -CN      LTG 2 Pt will improve gross shoulder strength to 4+/5 on left side.   -CN     LTG 2 Progress Progressing  -CN     LTG 3 Pt will achieve a DASH score of 15% to demonstrate independence with functional activities.  -CN     LTG 3 Progress Progressing  -CN       User Key  (r) = Recorded By, (t) = Taken By, (c) = Cosigned By    Initials Name Provider Type    NAHID Michele PT Physical Therapist                Therapy Education       08/21/17 1706          Therapy Education    Given HEP;Pain management;Posture/body mechanics  -CN      Program Reinforced  -CN      How Provided Verbal;Demonstration  -CN      Provided to Patient  -CN      Level of Understanding Teach back education performed  -CN        User Key  (r) = Recorded By, (t) = Taken By, (c) = Cosigned By    Initials Name Provider Type    NAHID Michele PT Physical Therapist                Time Calculation:   Start Time: 1632  Stop Time: 1718  Time Calculation (min): 46 min    Therapy Charges for Today     Code Description Service Date Service Provider Modifiers Qty    36689735973  PT THER PROC EA 15 MIN 8/21/2017 Devi Michele PT GP 2    [id number]  PT MANUAL THERAPY EA 15 MIN 8/21/2017 Devi Michele PT GP 1                    Devi Michele PT  8/21/2017

## 2017-08-24 ENCOUNTER — HOSPITAL ENCOUNTER (OUTPATIENT)
Dept: PHYSICAL THERAPY | Facility: HOSPITAL | Age: 52
Setting detail: THERAPIES SERIES
Discharge: HOME | End: 2017-08-24

## 2017-08-24 DIAGNOSIS — Z78.9 DIFFICULTY WITH ACTIVITIES OF DAILY LIVING: ICD-10-CM

## 2017-08-24 DIAGNOSIS — M25.512 CHRONIC LEFT SHOULDER PAIN: Primary | ICD-10-CM

## 2017-08-24 DIAGNOSIS — M75.02 ADHESIVE CAPSULITIS OF LEFT SHOULDER: ICD-10-CM

## 2017-08-24 DIAGNOSIS — G89.29 CHRONIC LEFT SHOULDER PAIN: Primary | ICD-10-CM

## 2017-08-24 PROCEDURE — 97140 MANUAL THERAPY 1/> REGIONS: CPT

## 2017-08-24 PROCEDURE — 97110 THERAPEUTIC EXERCISES: CPT

## 2017-08-24 NOTE — THERAPY TREATMENT NOTE
Outpatient Physical Therapy Ortho Treatment Note  [facility]     Patient Name: Justina Mccarty  : 1965  MRN: [id number]  Today's Date: 2017      Visit Date: 2017    Visit Dx:    ICD-10-CM ICD-9-CM   1. Chronic left shoulder pain M25.512 719.41    G89.29 338.29   2. Adhesive capsulitis of left shoulder M75.02 726.0   3. Difficulty with activities of daily living R53.81 799.3       There is no problem list on file for this patient.       Past Medical History:   Diagnosis Date   • Osteopenia         No past surgical history on file.                          PT Assessment/Plan       17 1654       PT Assessment    Assessment Comments Pt demonstrates improvement in shoulder PROM into abduction today. Continued with scapular stabilization exercises including prone activities and SL flexion and abduction with retraction. Discussed importance of scapular stability for improvement of symptoms with pt continues to perform HEP daily.   -CN     PT Plan    PT Plan Comments Continue with scapular stability. Consider light weight at [employer] with rhythmic stabilization next visit.   -CN       User Key  (r) = Recorded By, (t) = Taken By, (c) = Cosigned By    Initials Name Provider Type    CN Devi Michele, PT Physical Therapist                    Exercises       17 1600          Subjective Comments    Subjective Comments I could reach into the laundry a little better, but I have trouble carrying the laundry basket up the stairs.   -CN      Subjective Pain    Able to rate subjective pain? yes  -CN      Pre-Treatment Pain Level 2  -CN      Exercise 1    Exercise Name 1 Pulleys into flexion and abduction  -CN      Cueing 1 Demo  -CN      Reps 1 15  -CN      Time (Seconds) 1 5  -CN      Exercise 3    Exercise Name 3 UBE  -CN      Time (Minutes) 3 4  -CN      Exercise 6    Exercise Name 6 Prone shoulder extension  -CN      Cueing 6 Demo  -CN      Sets 6 2  -CN      Reps 6 10  -CN       Additional Comments 2#  -CN      Exercise 7    Exercise Name 7 Prone Ys and Ts  -CN      Cueing 7 Demo  -CN      Sets 7 2  -CN      Reps 7 10  -CN      Exercise 9    Exercise Name 9 Prone rows  -CN      Cueing 9 Demo  -CN      Sets 9 2  -CN      Reps 9 10  -CN      Additional Comments 2#  -CN      Exercise 10    Exercise Name 10 SL flexion and abduction, maintaining retraction  -CN      Cueing 10 Demo  -CN      Reps 10 10  -CN      Additional Comments 1#  -CN        User Key  (r) = Recorded By, (t) = Taken By, (c) = Cosigned By    Initials Name Provider Type    NAHID Michele PT Physical Therapist                        Manual Rx (last 36 hours)      Manual Treatments       08/24/17 1500          Manual Rx 1    Manual Rx 1 Location L shoulder, supine  -CN      Manual Rx 1 Type PROM and inf and posterior mobilizations to L shoulder  -CN      Manual Rx 1 Duration 15 min  -CN        User Key  (r) = Recorded By, (t) = Taken By, (c) = Cosigned By    Initials Name Provider Type    NAHID Michele PT Physical Therapist                    Therapy Education       08/24/17 1654          Therapy Education    Given HEP;Pain management;Posture/body mechanics  -CN      Program Progressed  -CN      How Provided Verbal;Demonstration  -CN      Provided to Patient  -CN      Level of Understanding Teach back education performed  -CN        User Key  (r) = Recorded By, (t) = Taken By, (c) = Cosigned By    Initials Name Provider Type    NAHID Michele PT Physical Therapist                Time Calculation:   Start Time: 1615  Stop Time: 1700  Time Calculation (min): 45 min    Therapy Charges for Today     Code Description Service Date Service Provider Modifiers Qty    43510443851 HC PT THER PROC EA 15 MIN 8/24/2017 Devi Michele PT GP 2    60861368257 HC PT MANUAL THERAPY EA 15 MIN 8/24/2017 Devi Michele PT GP 1                    Devi Michele PT  8/24/2017

## 2017-08-28 ENCOUNTER — HOSPITAL ENCOUNTER (OUTPATIENT)
Dept: PHYSICAL THERAPY | Facility: HOSPITAL | Age: 52
Setting detail: THERAPIES SERIES
Discharge: HOME | End: 2017-08-28

## 2017-08-28 DIAGNOSIS — Z78.9 DIFFICULTY WITH ACTIVITIES OF DAILY LIVING: ICD-10-CM

## 2017-08-28 DIAGNOSIS — M75.02 ADHESIVE CAPSULITIS OF LEFT SHOULDER: ICD-10-CM

## 2017-08-28 DIAGNOSIS — G89.29 CHRONIC LEFT SHOULDER PAIN: Primary | ICD-10-CM

## 2017-08-28 DIAGNOSIS — M25.512 CHRONIC LEFT SHOULDER PAIN: Primary | ICD-10-CM

## 2017-08-28 PROCEDURE — 97140 MANUAL THERAPY 1/> REGIONS: CPT

## 2017-08-28 PROCEDURE — 97110 THERAPEUTIC EXERCISES: CPT

## 2017-08-28 NOTE — THERAPY TREATMENT NOTE
Outpatient Physical Therapy Ortho Treatment Note  [facility]     Patient Name: Justina Mccarty  : 1965  MRN: [id number]  Today's Date: 2017      Visit Date: 2017    Visit Dx:    ICD-10-CM ICD-9-CM   1. Chronic left shoulder pain M25.512 719.41    G89.29 338.29   2. Adhesive capsulitis of left shoulder M75.02 726.0   3. Difficulty with activities of daily living R53.81 799.3       There is no problem list on file for this patient.       Past Medical History:   Diagnosis Date   • Osteopenia         No past surgical history on file.                          PT Assessment/Plan       17 1826       PT Assessment    Assessment Comments Pt's ROM continues to improve, especially into flexion and abduction. Progressed retractor strengthening today as pt demonstrates significant weakness in middle and lower trap muscle. Pt continuing with prone exercises at home and encouraged to engage scapular stabilizers with all shoulder elevation.   -CN     PT Plan    PT Plan Comments Continue with middle and lower trap strengthening and assess response to added exercises.   -CN       User Key  (r) = Recorded By, (t) = Taken By, (c) = Cosigned By    Initials Name Provider Type    CN Devi Michele, PT Physical Therapist                    Exercises       17 1700          Subjective Comments    Subjective Comments It is feeling ok. I think the motion is still improving, but out to the side is still bad. I am working on putting my seat belt on with my left hand.   -CN      Subjective Pain    Able to rate subjective pain? yes  -CN      Pre-Treatment Pain Level 1  -CN      Exercise 1    Exercise Name 1 Pulleys into flexion and abduction  -CN      Cueing 1 Demo  -CN      Reps 1 15  -CN      Time (Seconds) 1 5  -CN      Exercise 2    Exercise Name 2 Supine flexion with cane  -CN      Cueing 2 Demo  -CN      Reps 2 10  -CN      Additional Comments 3#  -CN      Exercise 3    Exercise Name 3 UBE  -CN       Time (Minutes) 3 4  -CN      Exercise 4    Exercise Name 4 Supine shoulder press  -CN      Cueing 4 Demo  -CN      Sets 4 2  -CN      Reps 4 10  -CN      Additional Comments 3# on bar  -CN      Exercise 5    Exercise Name 5 Supine press ups  -CN      Cueing 5 Demo  -CN      Sets 5 2  -CN      Reps 5 10  -CN      Additional Comments 3#  -CN      Exercise 11    Exercise Name 11 Rhythmic stabilization at [facility] Stuff with L UE extended  -CN      Reps 11 3  -CN      Time (Seconds) 11 20  -CN      Exercise 14    Exercise Name 14 D1 extension at [employer]  -CN      Cueing 14 Demo  -CN      Sets 14 2  -CN      Reps 14 10  -CN      Additional Comments 1 plate  -CN      Exercise 15    Exercise Name 15 D2 flexion  -CN      Reps 15 5  -CN      Additional Comments YTB  -CN        User Key  (r) = Recorded By, (t) = Taken By, (c) = Cosigned By    Initials Name Provider Type    NAHID Michele, LAURO Physical Therapist                        Manual Rx (last 36 hours)      Manual Treatments       08/28/17 1700          Manual Rx 1    Manual Rx 1 Location L shoulder, supine  -CN      Manual Rx 1 Type PROM and inf and posterior mobilizations to L shoulder  -CN      Manual Rx 1 Duration 15 min  -CN        User Key  (r) = Recorded By, (t) = Taken By, (c) = Cosigned By    Initials Name Provider Type    NAHID Michele PT Physical Therapist                PT OP Goals       08/28/17 1700       PT Short Term Goals    STG Date to Achieve 08/03/17  -CN     STG 1 Pt will report subjective pain at 3/10 or less to demonstrate symptom management with ADLs.   -CN     STG 1 Progress Met  -CN     STG 1 Progress Comments Pain rated 3/10 at worst with certain movements.   -CN     STG 2 Pt will be independent and compliant with HEP in order to facilitate self-management of symptoms.   -CN     STG 2 Progress Met  -CN     Long Term Goals    LTG Date to Achieve 08/24/17  -CN     LTG 1 Pt will increase shoulder AROM to flex=155, abd=140,  ER=75 and IR=75 on left side in order to perform all ADLs.   -CN     LTG 1 Progress Progressing  -CN     LTG 2 Pt will improve gross shoulder strength to 4+/5 on left side.   -CN     LTG 2 Progress Progressing  -CN     LTG 3 Pt will achieve a DASH score of 15% to demonstrate independence with functional activities.  -CN     LTG 3 Progress Progressing  -CN       User Key  (r) = Recorded By, (t) = Taken By, (c) = Cosigned By    Initials Name Provider Type    NAHID Michele PT Physical Therapist                Therapy Education       08/28/17 1742          Therapy Education    Given HEP;Pain management;Posture/body mechanics  -CN      Program Progressed  -CN      How Provided Verbal;Demonstration  -CN      Provided to Patient  -CN      Level of Understanding Teach back education performed  -CN        User Key  (r) = Recorded By, (t) = Taken By, (c) = Cosigned By    Initials Name Provider Type    NAHID Michele PT Physical Therapist                Time Calculation:   Start Time: 1715  Stop Time: 1800  Time Calculation (min): 45 min    Therapy Charges for Today     Code Description Service Date Service Provider Modifiers Qty    63027452277  PT THER PROC EA 15 MIN 8/28/2017 Devi Michele PT GP 2    [id number]  PT MANUAL THERAPY EA 15 MIN 8/28/2017 Devi Michele PT GP 1                    Devi Michele PT  8/28/2017

## 2017-08-30 ENCOUNTER — HOSPITAL ENCOUNTER (OUTPATIENT)
Dept: PHYSICAL THERAPY | Facility: HOSPITAL | Age: 52
Setting detail: THERAPIES SERIES
Discharge: HOME | End: 2017-08-30

## 2017-08-30 DIAGNOSIS — G89.29 CHRONIC LEFT SHOULDER PAIN: Primary | ICD-10-CM

## 2017-08-30 DIAGNOSIS — M25.512 CHRONIC LEFT SHOULDER PAIN: Primary | ICD-10-CM

## 2017-08-30 DIAGNOSIS — M75.02 ADHESIVE CAPSULITIS OF LEFT SHOULDER: ICD-10-CM

## 2017-08-30 DIAGNOSIS — Z78.9 DIFFICULTY WITH ACTIVITIES OF DAILY LIVING: ICD-10-CM

## 2017-08-30 PROCEDURE — 97110 THERAPEUTIC EXERCISES: CPT

## 2017-08-30 PROCEDURE — 97140 MANUAL THERAPY 1/> REGIONS: CPT

## 2017-09-05 ENCOUNTER — HOSPITAL ENCOUNTER (OUTPATIENT)
Dept: PHYSICAL THERAPY | Facility: HOSPITAL | Age: 52
Setting detail: THERAPIES SERIES
Discharge: HOME | End: 2017-09-05

## 2017-09-05 DIAGNOSIS — M25.512 CHRONIC LEFT SHOULDER PAIN: Primary | ICD-10-CM

## 2017-09-05 DIAGNOSIS — M75.02 ADHESIVE CAPSULITIS OF LEFT SHOULDER: ICD-10-CM

## 2017-09-05 DIAGNOSIS — Z78.9 DIFFICULTY WITH ACTIVITIES OF DAILY LIVING: ICD-10-CM

## 2017-09-05 DIAGNOSIS — G89.29 CHRONIC LEFT SHOULDER PAIN: Primary | ICD-10-CM

## 2017-09-05 PROCEDURE — 97110 THERAPEUTIC EXERCISES: CPT

## 2017-09-05 PROCEDURE — 97140 MANUAL THERAPY 1/> REGIONS: CPT

## 2017-09-05 NOTE — THERAPY TREATMENT NOTE
Outpatient Physical Therapy Ortho Treatment Note  [facility]     Patient Name: Justina Mccarty  : 1965  MRN: [id number]  Today's Date: 2017      Visit Date: 2017    Visit Dx:    ICD-10-CM ICD-9-CM   1. Chronic left shoulder pain M25.512 719.41    G89.29 338.29   2. Adhesive capsulitis of left shoulder M75.02 726.0   3. Difficulty with activities of daily living R53.81 799.3       There is no problem list on file for this patient.       Past Medical History:   Diagnosis Date   • Osteopenia         No past surgical history on file.                          PT Assessment/Plan       17 1710       PT Assessment    Assessment Comments Pt continues to demonstrate most significantly impaired ROM into abduction and ER with pain at end ranges of all planes. Pt having difficulty sleeping at night due to positioning of L UE as difficulty performing functional tasks such as dressing and fastening seat belt. Pt's pain levels remain mild to moderate, however consistent soreness/feelings of stiffness daily.   -CN     PT Plan    PT Plan Comments Continue with work on stability and shoulder strengthening to improve ROM and ability to perform functional tasks.   -CN       User Key  (r) = Recorded By, (t) = Taken By, (c) = Cosigned By    Initials Name Provider Type    NAHID Michele, PT Physical Therapist                    Exercises       17 1600          Subjective Comments    Subjective Comments It is feeling a little sore and stiff today.   -CN      Exercise 1    Exercise Name 1 Pulleys into flexion and abduction  -CN      Cueing 1 Demo  -CN      Reps 1 15  -CN      Time (Seconds) 1 5  -CN      Exercise 3    Exercise Name 3 UBE  -CN      Time (Minutes) 3 4  -CN      Exercise 4    Exercise Name 4 Supine shoulder press  -CN      Cueing 4 Demo  -CN      Sets 4 2  -CN      Reps 4 10  -CN      Additional Comments 3# on bar  -CN      Exercise 5    Exercise Name 5 Supine press ups  -CN       Cueing 5 Demo  -CN      Sets 5 2  -CN      Reps 5 10  -CN      Additional Comments 3#  -CN        User Key  (r) = Recorded By, (t) = Taken By, (c) = Cosigned By    Initials Name Provider Type    NAHID Michele PT Physical Therapist                        Manual Rx (last 36 hours)      Manual Treatments       09/05/17 1700          Manual Rx 1    Manual Rx 1 Location L shoulder, supine  -CN      Manual Rx 1 Type PROM and inf and posterior mobilizations to L shoulder  -CN      Manual Rx 2    Manual Rx 2 Location D1 and D2 flexion and extension  -CN      Manual Rx 2 Duration 25 min  -CN        User Key  (r) = Recorded By, (t) = Taken By, (c) = Cosigned By    Initials Name Provider Type    NAHID Michele PT Physical Therapist                PT OP Goals       09/05/17 1700       PT Short Term Goals    STG Date to Achieve 08/03/17  -CN     STG 1 Pt will report subjective pain at 3/10 or less to demonstrate symptom management with ADLs.   -CN     STG 1 Progress Met  -CN     STG 2 Pt will be independent and compliant with HEP in order to facilitate self-management of symptoms.   -CN     STG 2 Progress Met  -CN     Long Term Goals    LTG Date to Achieve 08/24/17  -CN     LTG 1 Pt will increase shoulder AROM to flex=155, abd=140, ER=75 and IR=75 on left side in order to perform all ADLs.   -CN     LTG 1 Progress Progressing  -CN     LTG 1 Progress Comments Pt continues with greatest limitations into abduction and ER.  -CN     LTG 2 Pt will improve gross shoulder strength to 4+/5 on left side.   -CN     LTG 2 Progress Progressing  -CN     LTG 3 Pt will achieve a DASH score of 15% to demonstrate independence with functional activities.  -CN     LTG 3 Progress Progressing  -CN       User Key  (r) = Recorded By, (t) = Taken By, (c) = Cosigned By    Initials Name Provider Type    NAHID Michele PT Physical Therapist                Therapy Education       09/05/17 1707          Therapy Education     Given HEP;Pain management;Posture/body mechanics  -CN      Program Progressed  -CN      How Provided Verbal;Demonstration  -CN      Provided to Patient  -CN      Level of Understanding Teach back education performed  -CN        User Key  (r) = Recorded By, (t) = Taken By, (c) = Cosigned By    Initials Name Provider Type    CN Devi Michele, PT Physical Therapist                Time Calculation:   Start Time: 1609  Stop Time: 1655  Time Calculation (min): 46 min    Therapy Charges for Today     Code Description Service Date Service Provider Modifiers Qty    90960049143  PT THER PROC EA 15 MIN 9/5/2017 Devi Michele, PT GP 1    65394164427 HC PT MANUAL THERAPY EA 15 MIN 9/5/2017 Devi Michele, PT GP 2                    Devi Michele PT  9/5/2017

## 2017-09-07 ENCOUNTER — APPOINTMENT (OUTPATIENT)
Dept: PHYSICAL THERAPY | Facility: HOSPITAL | Age: 52
End: 2017-09-07

## 2017-09-13 ENCOUNTER — HOSPITAL ENCOUNTER (OUTPATIENT)
Dept: PHYSICAL THERAPY | Facility: HOSPITAL | Age: 52
Setting detail: THERAPIES SERIES
Discharge: HOME | End: 2017-09-13

## 2017-09-13 DIAGNOSIS — M25.512 CHRONIC LEFT SHOULDER PAIN: Primary | ICD-10-CM

## 2017-09-13 DIAGNOSIS — Z78.9 DIFFICULTY WITH ACTIVITIES OF DAILY LIVING: ICD-10-CM

## 2017-09-13 DIAGNOSIS — G89.29 CHRONIC LEFT SHOULDER PAIN: Primary | ICD-10-CM

## 2017-09-13 DIAGNOSIS — M75.02 ADHESIVE CAPSULITIS OF LEFT SHOULDER: ICD-10-CM

## 2017-09-13 PROCEDURE — 97140 MANUAL THERAPY 1/> REGIONS: CPT

## 2017-09-13 PROCEDURE — 97110 THERAPEUTIC EXERCISES: CPT

## 2017-09-13 NOTE — THERAPY PROGRESS REPORT/RE-CERT
Outpatient Physical Therapy Ortho Re-Assessment  [facility]     Patient Name: Justina Mccarty  : 1965  MRN: [id number]  Today's Date: 2017      Visit Date: 2017    There is no problem list on file for this patient.       Past Medical History:   Diagnosis Date   • Osteopenia         No past surgical history on file.    Visit Dx:     ICD-10-CM ICD-9-CM   1. Chronic left shoulder pain M25.512 719.41    G89.29 338.29   2. Adhesive capsulitis of left shoulder M75.02 726.0   3. Difficulty with activities of daily living R53.81 799.3                 PT Ortho       17 1600    Left Shoulder    Flexion ROM Details 148/153  -CN    ABduction ROM Details 140/145  -CN    External Rotation ROM Details 65/69  -CN    Internal Rotation ROM Details 64/69  -CN    Left Shoulder    Flexion Gross Movement (4+/5) good plus  -CN    ABduction Gross Movement (4/5) good  -CN    Int Rotation Gross Movement (4+/5) good plus  -CN    Ext Rotation Gross Movement (4/5) good  -CN      User Key  (r) = Recorded By, (t) = Taken By, (c) = Cosigned By    Initials Name Provider Type    NAHID Michele, LAURO Physical Therapist                            Therapy Education       17 1641          Therapy Education    Given HEP;Pain management;Posture/body mechanics  -CN      Program Reinforced  -CN      How Provided Verbal;Demonstration  -CN      Provided to Patient  -CN      Level of Understanding Teach back education performed  -CN        User Key  (r) = Recorded By, (t) = Taken By, (c) = Cosigned By    Initials Name Provider Type    NAHID Michele, LAURO Physical Therapist                PT OP Goals       17 1600       PT Short Term Goals    STG Date to Achieve 17  -CN     STG 1 Pt will report subjective pain at 3/10 or less to demonstrate symptom management with ADLs.   -CN     STG 1 Progress Met  -CN     STG 2 Pt will be independent and compliant with HEP in order to facilitate self-management  of symptoms.   -CN     STG 2 Progress Met  -CN     Long Term Goals    LTG Date to Achieve 08/24/17  -CN     LTG 1 Pt will increase shoulder AROM to flex=155, abd=140, ER=75 and IR=75 on left side in order to perform all ADLs.   -CN     LTG 1 Progress Progressing  -CN     LTG 1 Progress Comments Pt's ROM continues to improve, however functional deficits noted into abduction, ER and IR.   -CN     LTG 2 Pt will improve gross shoulder strength to 4+/5 on left side.   -CN     LTG 2 Progress Progressing  -CN     LTG 2 Progress Comments MMT improving, however pt continues with significant pain with resisted abduction.   -CN     LTG 3 Pt will achieve a DASH score of 15% to demonstrate independence with functional activities.  -CN     LTG 3 Progress Progressing  -CN     LTG 3 Progress Comments Pt scored 21% where 0% is no disability.   -CN       User Key  (r) = Recorded By, (t) = Taken By, (c) = Cosigned By    Initials Name Provider Type    NAHID Michele, PT Physical Therapist                PT Assessment/Plan       09/13/17 1624       PT Assessment    Functional Limitations Limitations in functional capacity and performance;Limitation in home management;Performance in self-care ADL;Performance in work activities;Performance in leisure activities  -CN     Impairments Impaired flexibility;Muscle strength;Pain;Posture;Range of motion;Joint mobility  -CN     Assessment Comments Pt has attended 15 skilled therapy sessions for treatment of L shoulder pain. Pt has progressed well with therapy exercises and demonstrates improvements in ROM, however continues with deficits most noted into abduction, ER and IR. Functionally, pt continues having difficutly with reaching activites including fastening seat belt as well as doffing shirt. Pt also has difficulty donning sweater and reaching to the side. Pt continues with guarded movement in order to minimize pain and compliant with HEP. Emphasis of current HEP is regaining full  ROM and increasing scapular stability in order to decrease symptoms of impingement. Pt scored 21% on the DASH where 0% is no disability. Pt would benefit from continued skilled PT to address remaining deficts and return to PLOF.   -CN     Please refer to paper survey for additional self-reported information Yes  -CN     Rehab Potential Good  -CN     Patient/caregiver participated in establishment of treatment plan and goals Yes  -CN     Patient would benefit from skilled therapy intervention Yes  -CN     PT Plan    PT Frequency 2x/week;1x/week  -CN     Predicted Duration of Therapy Intervention (days/wks) 4 weeks  -CN     Planned CPT's? PT RE-EVAL: 44649;PT THER PROC EA 15 MIN: 37696;PT THER ACT EA 15 MIN: 15179;PT MANUAL THERAPY EA 15 MIN: 65329;PT NEUROMUSC RE-EDUCATION EA 15 MIN: 92512;PT HOT OR COLD PACK TREAT MCARE;PT ELECTRICAL STIM UNATTEND: ;PT ULTRASOUND EA 15 MIN: 74471  -CN     Physical Therapy Interventions (Optional Details) ROM (Range of Motion);manual therapy techniques;strengthening;modalities;stretching;neuromuscular re-education;patient/family education;home exercise program;postural re-education;joint mobilization  -CN     PT Plan Comments PT to treat 1-2x/week for 4 weeks consisting of ROM, strengthening and stabilization activities. Continue with UBE warm up with emphasis on stabilization exercises as tolerated.   -CN       User Key  (r) = Recorded By, (t) = Taken By, (c) = Cosigned By    Initials Name Provider Type    CN Devi Michele, PT Physical Therapist                  Exercises       09/13/17 1600          Subjective Comments    Subjective Comments It is achey today, I was sick earlier this week and couldn't do my exercises for two days.   -CN      Subjective Pain    Able to rate subjective pain? yes  -CN      Pre-Treatment Pain Level 2  -CN      Exercise 1    Exercise Name 1 Pulleys into flexion and abduction  -CN      Cueing 1 Demo  -CN      Reps 1 15  -CN      Time  (Seconds) 1 5  -CN      Exercise 2    Exercise Name 2 Verbally reviewed middle and low trap exercises and advised on exercise schedule.   -CN      Time (Minutes) 2 8  -CN      Exercise 3    Exercise Name 3 UBE  -CN      Time (Minutes) 3 4  -CN        User Key  (r) = Recorded By, (t) = Taken By, (c) = Cosigned By    Initials Name Provider Type    NAHID Michele PT Physical Therapist           Manual Rx (last 36 hours)      Manual Treatments       09/13/17 1500          Manual Rx 1    Manual Rx 1 Location L shoulder, supine  -CN      Manual Rx 1 Type PROM and inf and posterior mobilizations to L shoulder  -CN      Manual Rx 1 Duration 15 min  -CN        User Key  (r) = Recorded By, (t) = Taken By, (c) = Cosigned By    Initials Name Provider Type    NAHID Michele PT Physical Therapist                            Outcome Measures       09/13/17 1600          Functional Assessment    Outcome Measure Options Disabilities of the Arm, Shoulder, and Hand (DASH)   21% where 0% is no disability  -CN        User Key  (r) = Recorded By, (t) = Taken By, (c) = Cosigned By    Initials Name Provider Type    NAHID Michele PT Physical Therapist            Time Calculation:   Start Time: 1600  Stop Time: 1645  Time Calculation (min): 45 min     Therapy Charges for Today     Code Description Service Date Service Provider Modifiers Qty    59064491140  PT THER PROC EA 15 MIN 9/13/2017 Devi Michele PT GP 2    92998296391 HC PT MANUAL THERAPY EA 15 MIN 9/13/2017 Devi Michele PT GP 1          PT G-Codes  Outcome Measure Options: Disabilities of the Arm, Shoulder, and Hand (DASH) (21% where 0% is no disability)         Devi Michele PT  9/13/2017

## 2017-09-14 ENCOUNTER — APPOINTMENT (OUTPATIENT)
Dept: PHYSICAL THERAPY | Facility: HOSPITAL | Age: 52
End: 2017-09-14

## 2017-09-18 ENCOUNTER — HOSPITAL ENCOUNTER (OUTPATIENT)
Dept: PHYSICAL THERAPY | Facility: HOSPITAL | Age: 52
Setting detail: THERAPIES SERIES
Discharge: HOME | End: 2017-09-18

## 2017-09-18 DIAGNOSIS — M75.02 ADHESIVE CAPSULITIS OF LEFT SHOULDER: ICD-10-CM

## 2017-09-18 DIAGNOSIS — Z78.9 DIFFICULTY WITH ACTIVITIES OF DAILY LIVING: ICD-10-CM

## 2017-09-18 DIAGNOSIS — G89.29 CHRONIC LEFT SHOULDER PAIN: Primary | ICD-10-CM

## 2017-09-18 DIAGNOSIS — M25.512 CHRONIC LEFT SHOULDER PAIN: Primary | ICD-10-CM

## 2017-09-18 PROCEDURE — 97140 MANUAL THERAPY 1/> REGIONS: CPT | Performed by: PHYSICAL THERAPIST

## 2017-09-18 PROCEDURE — 97110 THERAPEUTIC EXERCISES: CPT | Performed by: PHYSICAL THERAPIST

## 2017-09-18 NOTE — THERAPY TREATMENT NOTE
Outpatient Physical Therapy Ortho Treatment Note  [facility]     Patient Name: Justina Mccarty  : 1965  MRN: [id number]  Today's Date: 2017      Visit Date: 2017    Visit Dx:    ICD-10-CM ICD-9-CM   1. Chronic left shoulder pain M25.512 719.41    G89.29 338.29   2. Adhesive capsulitis of left shoulder M75.02 726.0   3. Difficulty with activities of daily living R53.81 799.3       There is no problem list on file for this patient.       Past Medical History:   Diagnosis Date   • Osteopenia         No past surgical history on file.                          PT Assessment/Plan       17 1706       PT Assessment    Assessment Comments Patient feels she is making gradual, steady progress but still has limitations with some daily functional activities especially those involving OH/reaching and lifting/carrying /OH activiites secondary to pain and impaired shoulder mobility/strength.    -RA     PT Plan    PT Plan Comments Continue working on shoulder ROM, stability, and strengthening to improve L UE function  -RA       User Key  (r) = Recorded By, (t) = Taken By, (c) = Cosigned By    Initials Name Provider Type    RA Dulce Pringle, PT Physical Therapist                    Exercises       17 1600          Subjective Comments    Subjective Comments Tried going through [employer] at [employer] and had difficulty and increased pain with reaching to get tube.  More sore last few days - might be from pushing myself a little harder.    -RA      Subjective Pain    Able to rate subjective pain? yes  -RA      Pre-Treatment Pain Level 3  -RA      Exercise 1    Exercise Name 1 Pulleys into flexion and abduction  -RA      Cueing 1 Demo  -RA      Reps 1 15  -RA      Time (Seconds) 1 5  -RA      Exercise 2    Exercise Name 2 --  -RA      Time (Minutes) 2 --  -RA      Exercise 3    Exercise Name 3 UBE  -RA      Time (Minutes) 3 4  -RA      Exercise 5    Exercise Name 5 Supine press ups  -RA      Cueing 5 Demo   -RA      Sets 5 2  -RA      Reps 5 10  -RA      Additional Comments 3#  -RA      Exercise 12    Exercise Name 12 Supine ABCs  -RA      Cueing 12 Demo  -RA      Reps 12 1  -RA      Additional Comments 3#  -RA      Exercise 14    Exercise Name 14 D1 extension   -RA      Cueing 14 Demo  -RA      Sets 14 2  -RA      Reps 14 10  -RA      Additional Comments YTB  -RA      Exercise 15    Exercise Name 15 D2 flexion  -RA      Cueing 15 Demo  -RA      Sets 15 2  -RA      Reps 15 10  -RA      Additional Comments YTB  -RA        User Key  (r) = Recorded By, (t) = Taken By, (c) = Cosigned By    Initials Name Provider Type    JOANNE Pringle PT Physical Therapist                        Manual Rx (last 36 hours)      Manual Treatments       09/18/17 1500          Manual Rx 1    Manual Rx 1 Location L shoulder, supine  -RA      Manual Rx 1 Type PROM and inf and posterior mobilizations to L shoulder  -RA      Manual Rx 1 Duration 15 min  -RA        User Key  (r) = Recorded By, (t) = Taken By, (c) = Cosigned By    Initials Name Provider Type    JOANNE Pringle PT Physical Therapist                    Therapy Education       09/18/17 1622          Therapy Education    Given HEP;Pain management;Posture/body mechanics  -RA      Program Reinforced  -RA      How Provided Verbal;Demonstration  -RA      Provided to Patient  -RA      Level of Understanding Verbalized;Teach back education performed  -RA        User Key  (r) = Recorded By, (t) = Taken By, (c) = Cosigned By    Initials Name Provider Type    JOANNE Pringle PT Physical Therapist                Time Calculation:   Start Time: 1615  Stop Time: 1700  Time Calculation (min): 45 min    Therapy Charges for Today     Code Description Service Date Service Provider Modifiers Qty    44184226193  PT THER PROC EA 15 MIN 9/18/2017 Dulce Pringle PT GP 2    27054195635 HC PT MANUAL THERAPY EA 15 MIN 9/18/2017 Dulce Pringle PT GP 1                    Dulce Pringle  PT  9/18/2017

## 2017-09-26 ENCOUNTER — HOSPITAL ENCOUNTER (OUTPATIENT)
Dept: PHYSICAL THERAPY | Facility: HOSPITAL | Age: 52
Setting detail: THERAPIES SERIES
Discharge: HOME | End: 2017-09-26

## 2017-09-26 DIAGNOSIS — M75.02 ADHESIVE CAPSULITIS OF LEFT SHOULDER: ICD-10-CM

## 2017-09-26 DIAGNOSIS — Z78.9 DIFFICULTY WITH ACTIVITIES OF DAILY LIVING: ICD-10-CM

## 2017-09-26 DIAGNOSIS — G89.29 CHRONIC LEFT SHOULDER PAIN: Primary | ICD-10-CM

## 2017-09-26 DIAGNOSIS — M25.512 CHRONIC LEFT SHOULDER PAIN: Primary | ICD-10-CM

## 2017-09-26 PROCEDURE — 97110 THERAPEUTIC EXERCISES: CPT

## 2017-09-26 PROCEDURE — 97140 MANUAL THERAPY 1/> REGIONS: CPT

## 2017-09-26 NOTE — THERAPY TREATMENT NOTE
Outpatient Physical Therapy Ortho Treatment Note  [facility]     Patient Name: Justina Mccarty  : 1965  MRN: [id number]  Today's Date: 2017      Visit Date: 2017    Visit Dx:    ICD-10-CM ICD-9-CM   1. Chronic left shoulder pain M25.512 719.41    G89.29 338.29   2. Adhesive capsulitis of left shoulder M75.02 726.0   3. Difficulty with activities of daily living R53.81 799.3       There is no problem list on file for this patient.       Past Medical History:   Diagnosis Date   • Osteopenia         No past surgical history on file.                          PT Assessment/Plan       17 1708       PT Assessment    Assessment Comments Pt continues with optimal compliance with HEP and demonstrates imrovement in shoulder ER today. Pt states that she has been attempting to use L UE for functional activities including reaching for seat belt, however continues to have pain and requires compenstion for completion of task. Continued to progress exercises today to include horizontal abduction and B UE ER which pt will add to HEP.   -CN     PT Plan    PT Plan Comments Continue with scapular stabilization and end ROM as tolerated.   -CN       User Key  (r) = Recorded By, (t) = Taken By, (c) = Cosigned By    Initials Name Provider Type    NAHID Michele, PT Physical Therapist                    Exercises       17 1600          Subjective Comments    Subjective Comments It was sore last week and I was trying to do more with it at home. I have been using it more to reach for my seat belt and out to the side. It still feels really weak.   -CN      Subjective Pain    Able to rate subjective pain? yes  -CN      Pre-Treatment Pain Level 1  -CN      Exercise 1    Exercise Name 1 Pulleys into flexion and abduction  -CN      Cueing 1 Demo  -CN      Reps 1 15  -CN      Time (Seconds) 1 5  -CN      Exercise 2    Exercise Name 2 Standing horizontal abduction  -CN      Cueing 2 Demo  -CN      Sets 2 2   -CN      Reps 2 10  -CN      Additional Comments RTB first set, YTB second set  -CN      Exercise 3    Exercise Name 3 UBE  -CN      Time (Minutes) 3 4  -CN      Exercise 4    Exercise Name 4 B UE ER  -CN      Cueing 4 Demo  -CN      Sets 4 2  -CN      Reps 4 10  -CN      Additional Comments RTB  -CN      Exercise 5    Exercise Name 5 Supine press ups  -CN      Cueing 5 Demo  -CN      Sets 5 2  -CN      Reps 5 10  -CN      Additional Comments 3#  -CN      Exercise 12    Exercise Name 12 Supine ABCs  -CN      Cueing 12 Demo  -CN      Reps 12 1  -CN      Additional Comments 3#  -CN        User Key  (r) = Recorded By, (t) = Taken By, (c) = Cosigned By    Initials Name Provider Type    NAHID Michele PT Physical Therapist                        Manual Rx (last 36 hours)      Manual Treatments       09/26/17 1500          Manual Rx 1    Manual Rx 1 Location L shoulder, supine  -CN      Manual Rx 1 Type PROM and inf and posterior mobilizations to L shoulder  -CN      Manual Rx 1 Duration 15 min  -CN        User Key  (r) = Recorded By, (t) = Taken By, (c) = Cosigned By    Initials Name Provider Type    NAHID Michele PT Physical Therapist                PT OP Goals       09/26/17 1700       PT Short Term Goals    STG Date to Achieve 08/03/17  -CN     STG 1 Pt will report subjective pain at 3/10 or less to demonstrate symptom management with ADLs.   -CN     STG 1 Progress Met  -CN     STG 2 Pt will be independent and compliant with HEP in order to facilitate self-management of symptoms.   -CN     STG 2 Progress Met  -CN     Long Term Goals    LTG Date to Achieve 08/24/17  -CN     LTG 1 Pt will increase shoulder AROM to flex=155, abd=140, ER=75 and IR=75 on left side in order to perform all ADLs.   -CN     LTG 1 Progress Progressing  -CN     LTG 1 Progress Comments Pt demonstrates improvement in shoulder ER today.   -CN     LTG 2 Pt will improve gross shoulder strength to 4+/5 on left side.   -CN      LTG 2 Progress Progressing  -CN     LTG 3 Pt will achieve a DASH score of 15% to demonstrate independence with functional activities.  -CN     LTG 3 Progress Progressing  -CN       User Key  (r) = Recorded By, (t) = Taken By, (c) = Cosigned By    Initials Name Provider Type    NAHID Michele PT Physical Therapist                Therapy Education       09/26/17 1652          Therapy Education    Given HEP;Pain management;Posture/body mechanics  -CN      Program Reinforced  -CN      How Provided Verbal;Demonstration  -CN      Provided to Patient  -CN      Level of Understanding Verbalized;Teach back education performed  -CN        User Key  (r) = Recorded By, (t) = Taken By, (c) = Cosigned By    Initials Name Provider Type    NAHID Michele PT Physical Therapist                Time Calculation:   Start Time: 1615  Stop Time: 1700  Time Calculation (min): 45 min    Therapy Charges for Today     Code Description Service Date Service Provider Modifiers Qty    89244813722  PT THER PROC EA 15 MIN 9/26/2017 Devi Michele, PT GP 2    [id number]  PT MANUAL THERAPY EA 15 MIN 9/26/2017 Devi Michele PT GP 1                    Devi Michele PT  9/26/2017

## 2017-10-02 ENCOUNTER — HOSPITAL ENCOUNTER (OUTPATIENT)
Dept: PHYSICAL THERAPY | Facility: HOSPITAL | Age: 52
Setting detail: THERAPIES SERIES
Discharge: HOME | End: 2017-10-02

## 2017-10-02 DIAGNOSIS — G89.29 CHRONIC LEFT SHOULDER PAIN: Primary | ICD-10-CM

## 2017-10-02 DIAGNOSIS — M25.512 CHRONIC LEFT SHOULDER PAIN: Primary | ICD-10-CM

## 2017-10-02 DIAGNOSIS — Z78.9 DIFFICULTY WITH ACTIVITIES OF DAILY LIVING: ICD-10-CM

## 2017-10-02 DIAGNOSIS — M75.02 ADHESIVE CAPSULITIS OF LEFT SHOULDER: ICD-10-CM

## 2017-10-02 PROCEDURE — 97140 MANUAL THERAPY 1/> REGIONS: CPT

## 2017-10-02 PROCEDURE — 97110 THERAPEUTIC EXERCISES: CPT

## 2017-10-02 NOTE — THERAPY TREATMENT NOTE
Outpatient Physical Therapy Ortho Treatment Note  [facility]     Patient Name: Justina Mccarty  : 1965  MRN: [id number]  Today's Date: 10/2/2017      Visit Date: 10/02/2017    Visit Dx:    ICD-10-CM ICD-9-CM   1. Chronic left shoulder pain M25.512 719.41    G89.29 338.29   2. Adhesive capsulitis of left shoulder M75.02 726.0   3. Difficulty with activities of daily living R53.81 799.3       There is no problem list on file for this patient.       Past Medical History:   Diagnosis Date   • Osteopenia         No past surgical history on file.                          PT Assessment/Plan       10/02/17 1727       PT Assessment    Assessment Comments Pt reports continued improvement with functional activities including reaching for seat belt, however continues to have difficulty donning/doffing jacket. Added lat pull downs and wall walks today which pt was able to tolerate without increased pain, only mild soreness. Pt continues to be candidate for skilled PT services due to difficulty performing ADLs involving abduction, ER and IR.   -CN     PT Plan    PT Plan Comments Continue with retraction exercises as tolerated.   -CN       User Key  (r) = Recorded By, (t) = Taken By, (c) = Cosigned By    Initials Name Provider Type    NAHID Michele, PT Physical Therapist                    Exercises       10/02/17 1600          Subjective Comments    Subjective Comments It has been a little more achey over the past few days, but I have been able to put on my seat belt for the past few days. I'm still compensating a little bit, but I did it. I still   -CN      Subjective Pain    Able to rate subjective pain? yes  -CN      Pre-Treatment Pain Level 2  -CN      Exercise 1    Exercise Name 1 Pulleys into flexion and abduction  -CN      Cueing 1 Demo  -CN      Reps 1 15  -CN      Time (Seconds) 1 5  -CN      Exercise 2    Exercise Name 2 Standing horizontal abduction  -CN      Cueing 2 Demo  -CN      Sets 2 2  -CN       Reps 2 10  -CN      Additional Comments RTB  -CN      Exercise 3    Exercise Name 3 UBE  -CN      Time (Minutes) 3 4  -CN      Exercise 4    Exercise Name 4 B UE ER  -CN      Cueing 4 Demo  -CN      Sets 4 2  -CN      Reps 4 10  -CN      Additional Comments RTB  -CN      Exercise 6    Exercise Name 6 Lat pull down  -CN      Cueing 6 Demo  -CN      Sets 6 2  -CN      Reps 6 10  -CN      Additional Comments RTB  -CN      Exercise 12    Exercise Name 12 Supine ABCs  -CN      Cueing 12 Demo  -CN      Reps 12 1  -CN      Additional Comments 3#  -CN      Exercise 13    Exercise Name 13 Supine punches  -CN      Cueing 13 Demo  -CN      Sets 13 2  -CN      Reps 13 10  -CN      Additional Comments 3#  -CN      Exercise 15    Exercise Name 15 D2 flexion  -CN      Cueing 15 Demo  -CN      Sets 15 2  -CN      Reps 15 10  -CN      Additional Comments YTB  -CN      Exercise 16    Exercise Name 16 Wall walks  -CN      Cueing 16 Demo  -CN      Reps 16 10  -CN      Additional Comments RTB  -CN      Exercise 17    Exercise Name 17 Push up plus  -CN        User Key  (r) = Recorded By, (t) = Taken By, (c) = Cosigned By    Initials Name Provider Type    NAHID Michele, PT Physical Therapist                        Manual Rx (last 36 hours)      Manual Treatments       10/02/17 1500          Manual Rx 1    Manual Rx 1 Location L shoulder, supine  -CN      Manual Rx 1 Type PROM and inf and posterior mobilizations to L shoulder  -CN      Manual Rx 1 Duration 15 min  -CN        User Key  (r) = Recorded By, (t) = Taken By, (c) = Cosigned By    Initials Name Provider Type    NAHID Michele, PT Physical Therapist                PT OP Goals       10/02/17 1700       PT Short Term Goals    STG Date to Achieve 08/03/17  -CN     STG 1 Pt will report subjective pain at 3/10 or less to demonstrate symptom management with ADLs.   -CN     STG 1 Progress Met  -CN     STG 2 Pt will be independent and compliant with HEP in order  to facilitate self-management of symptoms.   -CN     STG 2 Progress Met  -CN     Long Term Goals    LTG Date to Achieve 08/24/17  -CN     LTG 1 Pt will increase shoulder AROM to flex=155, abd=140, ER=75 and IR=75 on left side in order to perform all ADLs.   -CN     LTG 1 Progress Progressing  -CN     LTG 2 Pt will improve gross shoulder strength to 4+/5 on left side.   -CN     LTG 2 Progress Progressing  -CN     LTG 3 Pt will achieve a DASH score of 15% to demonstrate independence with functional activities.  -CN     LTG 3 Progress Progressing  -CN       User Key  (r) = Recorded By, (t) = Taken By, (c) = Cosigned By    Initials Name Provider Type    NAHID Michele PT Physical Therapist                Therapy Education       10/02/17 1659          Therapy Education    Given HEP;Pain management;Posture/body mechanics  -CN      Program Reinforced  -CN      How Provided Verbal;Demonstration  -CN      Provided to Patient  -CN      Level of Understanding Verbalized;Teach back education performed  -CN        User Key  (r) = Recorded By, (t) = Taken By, (c) = Cosigned By    Initials Name Provider Type    NAHID Michele PT Physical Therapist                Time Calculation:   Start Time: 1630  Stop Time: 1715  Time Calculation (min): 45 min    Therapy Charges for Today     Code Description Service Date Service Provider Modifiers Qty    26538419357 HC PT THER PROC EA 15 MIN 10/2/2017 Devi Michele PT GP 2    73444944478 HC PT MANUAL THERAPY EA 15 MIN 10/2/2017 Devi Michele PT GP 1                    Devi Michele PT  10/2/2017

## 2017-10-09 ENCOUNTER — HOSPITAL ENCOUNTER (OUTPATIENT)
Dept: PHYSICAL THERAPY | Facility: HOSPITAL | Age: 52
Setting detail: THERAPIES SERIES
Discharge: HOME | End: 2017-10-09

## 2017-10-09 DIAGNOSIS — Z78.9 DIFFICULTY WITH ACTIVITIES OF DAILY LIVING: ICD-10-CM

## 2017-10-09 DIAGNOSIS — M25.512 CHRONIC LEFT SHOULDER PAIN: Primary | ICD-10-CM

## 2017-10-09 DIAGNOSIS — M75.02 ADHESIVE CAPSULITIS OF LEFT SHOULDER: ICD-10-CM

## 2017-10-09 DIAGNOSIS — G89.29 CHRONIC LEFT SHOULDER PAIN: Primary | ICD-10-CM

## 2017-10-09 PROCEDURE — 97110 THERAPEUTIC EXERCISES: CPT

## 2017-10-09 PROCEDURE — 97140 MANUAL THERAPY 1/> REGIONS: CPT

## 2017-10-09 NOTE — THERAPY TREATMENT NOTE
Outpatient Physical Therapy Ortho Treatment Note  [facility]     Patient Name: Justina Mccarty  : 1965  MRN: [id number]  Today's Date: 10/9/2017      Visit Date: 10/09/2017    Visit Dx:    ICD-10-CM ICD-9-CM   1. Chronic left shoulder pain M25.512 719.41    G89.29 338.29   2. Adhesive capsulitis of left shoulder M75.02 726.0   3. Difficulty with activities of daily living R53.81 799.3       There is no problem list on file for this patient.       Past Medical History:   Diagnosis Date   • Osteopenia         No past surgical history on file.                          PT Assessment/Plan       10/09/17 1045       PT Assessment    Assessment Comments Pt continues to attempt challenging ADLs and reports weakness with reaching for seatbelt and doffing coat. Continued with dynamic exercises today to improve ease of performance of ADLs and weakness noted with scapular stabilizers, although much improved since initiating PT.   -CN     PT Plan    PT Plan Comments Reeval next visit.   -CN       User Key  (r) = Recorded By, (t) = Taken By, (c) = Cosigned By    Initials Name Provider Type    CN Devi Michele, PT Physical Therapist                    Exercises       10/09/17 1000          Subjective Comments    Subjective Comments It feels about the same, I am still trying to challenge it with putting on a coat and reaching for my seat belt. I am still having a little trouble loading the  as well.   -CN      Subjective Pain    Able to rate subjective pain? yes  -CN      Pre-Treatment Pain Level 1  -CN      Exercise 1    Exercise Name 1 Pulleys into flexion and abduction  -CN      Cueing 1 Demo  -CN      Reps 1 15  -CN      Time (Seconds) 1 5  -CN      Exercise 3    Exercise Name 3 UBE  -CN      Time (Minutes) 3 4  -CN      Exercise 7    Exercise Name 7 Ball bouces up wall  -CN      Cueing 7 Demo  -CN      Sets 7 2  -CN      Reps 7 5  -CN      Additional Comments L1 ball  -CN      Exercise 8     Exercise Name 8 Protracted ball circles on wall  -CN      Cueing 8 Demo  -CN      Reps 8 20   CW and CCW  -CN      Exercise 9    Exercise Name 9 D2 extension at ff stuff  -CN      Cueing 9 Demo  -CN      Sets 9 2  -CN      Reps 9 10  -CN      Additional Comments 2 bars  -CN      Exercise 10    Exercise Name 10 D1 extension at Tuff stuff  -CN      Cueing 10 Demo  -CN      Sets 10 2  -CN      Reps 10 10  -CN      Additional Comments 2 bars  -CN      Exercise 12    Exercise Name 12 Supine ABCs  -CN      Cueing 12 Demo  -CN      Reps 12 1  -CN      Additional Comments 4#  -CN      Exercise 13    Exercise Name 13 Supine punches  -CN      Cueing 13 Demo  -CN      Sets 13 2  -CN      Reps 13 10  -CN      Additional Comments 4#  -CN        User Key  (r) = Recorded By, (t) = Taken By, (c) = Cosigned By    Initials Name Provider Type    NAHID Michele, PT Physical Therapist                        Manual Rx (last 36 hours)      Manual Treatments       10/09/17 0900          Manual Rx 1    Manual Rx 1 Location L shoulder, supine  -CN      Manual Rx 1 Type PROM and inf and posterior mobilizations to L shoulder  -CN      Manual Rx 2    Manual Rx 2 Location rhythmic stabilization  -CN      Manual Rx 2 Duration 25 min  -CN        User Key  (r) = Recorded By, (t) = Taken By, (c) = Cosigned By    Initials Name Provider Type    NAHID Michele, PT Physical Therapist                PT OP Goals       10/09/17 1000       PT Short Term Goals    STG Date to Achieve 08/03/17  -CN     STG 1 Pt will report subjective pain at 3/10 or less to demonstrate symptom management with ADLs.   -CN     STG 1 Progress Met  -CN     STG 2 Pt will be independent and compliant with HEP in order to facilitate self-management of symptoms.   -CN     STG 2 Progress Met  -CN     Long Term Goals    LTG Date to Achieve 08/24/17  -CN     LTG 1 Pt will increase shoulder AROM to flex=155, abd=140, ER=75 and IR=75 on left side in order to perform  all ADLs.   -CN     LTG 1 Progress Progressing  -CN     LTG 1 Progress Comments Pt continuing to progress with ROM all planes.   -CN     LTG 2 Pt will improve gross shoulder strength to 4+/5 on left side.   -CN     LTG 2 Progress Progressing  -CN     LTG 3 Pt will achieve a DASH score of 15% to demonstrate independence with functional activities.  -CN     LTG 3 Progress Progressing  -CN       User Key  (r) = Recorded By, (t) = Taken By, (c) = Cosigned By    Initials Name Provider Type    NAHID Michele PT Physical Therapist                Therapy Education       10/09/17 1038          Therapy Education    Given HEP;Pain management;Posture/body mechanics  -CN      Program Reinforced  -CN      How Provided Verbal;Demonstration  -CN      Provided to Patient  -CN      Level of Understanding Verbalized;Teach back education performed  -CN        User Key  (r) = Recorded By, (t) = Taken By, (c) = Cosigned By    Initials Name Provider Type    NAHID Michele PT Physical Therapist                Time Calculation:   Start Time: 0945  Stop Time: 1030  Time Calculation (min): 45 min    Therapy Charges for Today     Code Description Service Date Service Provider Modifiers Qty    [id number]  PT THER PROC EA 15 MIN 10/9/2017 Devi Michele, PT GP 1    [id number] HC PT MANUAL THERAPY EA 15 MIN 10/9/2017 Devi Michele PT GP 2                    Devi Michele PT  10/9/2017

## 2018-01-16 ENCOUNTER — DOCUMENTATION (OUTPATIENT)
Dept: PHYSICAL THERAPY | Facility: HOSPITAL | Age: 53
End: 2018-01-16

## 2018-01-16 DIAGNOSIS — G89.29 CHRONIC LEFT SHOULDER PAIN: Primary | ICD-10-CM

## 2018-01-16 DIAGNOSIS — M75.02 ADHESIVE CAPSULITIS OF LEFT SHOULDER: ICD-10-CM

## 2018-01-16 DIAGNOSIS — Z78.9 DIFFICULTY WITH ACTIVITIES OF DAILY LIVING: ICD-10-CM

## 2018-01-16 DIAGNOSIS — M25.512 CHRONIC LEFT SHOULDER PAIN: Primary | ICD-10-CM

## 2018-01-16 NOTE — THERAPY DISCHARGE NOTE
Outpatient Physical Therapy Discharge Summary         Patient Name: Justina Mccarty  : 1965  MRN: [id number]    Today's Date: 2018    Visit Dx:    ICD-10-CM ICD-9-CM   1. Chronic left shoulder pain M25.512 719.41    G89.29 338.29   2. Adhesive capsulitis of left shoulder M75.02 726.0   3. Difficulty with activities of daily living R53.81 799.3             PT OP Goals       18 1800       PT Short Term Goals    STG Date to Achieve 17  -CN     STG 1 Pt will report subjective pain at 3/10 or less to demonstrate symptom management with ADLs.   -CN     STG 1 Progress Met  -CN     STG 2 Pt will be independent and compliant with HEP in order to facilitate self-management of symptoms.   -CN     STG 2 Progress Met  -CN     Long Term Goals    LTG Date to Achieve 17  -CN     LTG 1 Pt will increase shoulder AROM to flex=155, abd=140, ER=75 and IR=75 on left side in order to perform all ADLs.   -CN     LTG 1 Progress Partially Met  -CN     LTG 2 Pt will improve gross shoulder strength to 4+/5 on left side.   -CN     LTG 2 Progress Partially Met  -CN     LTG 3 Pt will achieve a DASH score of 15% to demonstrate independence with functional activities.  -CN     LTG 3 Progress Not Met  -CN       User Key  (r) = Recorded By, (t) = Taken By, (c) = Cosigned By    Initials Name Provider Type    NAHID Michele, PT Physical Therapist          OP PT Discharge Summary  Date of Discharge: 18  Reason for Discharge: other (comment) (Pt self D/C from PT)  Outcomes Achieved: Patient able to partially acheive established goals  Discharge Destination: Home with home program  Discharge Instructions: Pt attended 19 skilled therapy sessions for treatment of L shoulder pain. Pt progressed with ROM and strength, however continued having difficulty with functional activities such as reaching for seat belt. Pt instructed to continue with stretching daily and strengthening 3x/week. Pt self D/C from PT  services.       Time Calculation:                    Devi Michele, PT  1/16/2018

## 2018-07-19 ENCOUNTER — APPOINTMENT (OUTPATIENT)
Dept: WOMENS IMAGING | Facility: HOSPITAL | Age: 53
End: 2018-07-19

## 2018-07-19 PROCEDURE — 77063 BREAST TOMOSYNTHESIS BI: CPT | Performed by: RADIOLOGY

## 2018-07-19 PROCEDURE — 77067 SCR MAMMO BI INCL CAD: CPT | Performed by: RADIOLOGY

## 2019-10-02 ENCOUNTER — APPOINTMENT (OUTPATIENT)
Dept: WOMENS IMAGING | Facility: HOSPITAL | Age: 54
End: 2019-10-02

## 2019-10-02 PROCEDURE — 77063 BREAST TOMOSYNTHESIS BI: CPT | Performed by: RADIOLOGY

## 2019-10-02 PROCEDURE — 77067 SCR MAMMO BI INCL CAD: CPT | Performed by: RADIOLOGY

## 2020-11-13 ENCOUNTER — APPOINTMENT (OUTPATIENT)
Dept: WOMENS IMAGING | Facility: HOSPITAL | Age: 55
End: 2020-11-13

## 2020-11-13 PROCEDURE — 77063 BREAST TOMOSYNTHESIS BI: CPT | Performed by: RADIOLOGY

## 2020-11-13 PROCEDURE — 77067 SCR MAMMO BI INCL CAD: CPT | Performed by: RADIOLOGY

## 2022-01-07 ENCOUNTER — APPOINTMENT (OUTPATIENT)
Dept: WOMENS IMAGING | Facility: HOSPITAL | Age: 57
End: 2022-01-07

## 2022-01-07 PROCEDURE — 77067 SCR MAMMO BI INCL CAD: CPT | Performed by: RADIOLOGY

## 2022-01-07 PROCEDURE — 77063 BREAST TOMOSYNTHESIS BI: CPT | Performed by: RADIOLOGY

## 2022-01-20 ENCOUNTER — TRANSCRIBE ORDERS (OUTPATIENT)
Dept: PHYSICAL THERAPY | Facility: HOSPITAL | Age: 57
End: 2022-01-20

## 2022-01-20 DIAGNOSIS — M25.511 RIGHT SHOULDER PAIN, UNSPECIFIED CHRONICITY: Primary | ICD-10-CM

## 2022-02-01 ENCOUNTER — TREATMENT (OUTPATIENT)
Dept: PHYSICAL THERAPY | Facility: CLINIC | Age: 57
End: 2022-02-01

## 2022-02-01 ENCOUNTER — TRANSCRIBE ORDERS (OUTPATIENT)
Dept: PHYSICAL THERAPY | Facility: CLINIC | Age: 57
End: 2022-02-01

## 2022-02-01 DIAGNOSIS — M75.01 ADHESIVE CAPSULITIS OF RIGHT SHOULDER: Primary | ICD-10-CM

## 2022-02-01 PROCEDURE — 97162 PT EVAL MOD COMPLEX 30 MIN: CPT | Performed by: PHYSICAL THERAPIST

## 2022-02-01 PROCEDURE — 97110 THERAPEUTIC EXERCISES: CPT | Performed by: PHYSICAL THERAPIST

## 2022-02-01 NOTE — PROGRESS NOTES
Physical Therapy Initial Evaluation and Plan of Care      Patient: Justina Mccarty   : 1965  Diagnosis/ICD-10 Code:  Adhesive capsulitis of right shoulder [M75.01]  Referring practitioner: Danilo Hayes Jr.,*  Date of Initial Visit: 2022  Today's Date: 2022  Patient seen for 1 sessions           Subjective Evaluation    History of Present Illness  Date of onset: 2021  Mechanism of injury: PLOF;  Independent with all ADL's    Subjective comment: pt presents to PT reporting recent dx of (R) adhesive capsulitis. had steroid injection (R) shoulder on monday and is responding well. Quality of life: good    Pain  Current pain ratin  At best pain ratin  At worst pain ratin  Quality: dull ache, tight, discomfort and knife-like  Aggravating factors: outstretched reach, repetitive movement, standing, stairs, movement and lifting    Patient Goals  Patient goals for therapy: decreased pain, improved balance, increased motion, independence with ADLs/IADLs, increased strength and return to sport/leisure activities             Objective          Palpation     Right Tenderness of the biceps, supraspinatus and upper trapezius.     Cervical/Thoracic Screen   Cervical range of motion within normal limits  Thoracic range of motion within normal limits    Neurological Testing     Sensation     Shoulder   Left Shoulder   Intact: light touch    Right Shoulder   Intact: light touch    Reflexes   Left   Deltoid (C5): trace (1+)  Biceps (C5/C6): trace (1+)    Right   Deltoid (C5): trace (1+)  Biceps (C5/C6): trace (1+)    Passive Range of Motion     Right Shoulder   Flexion: 110 degrees   Abduction: 95 degrees     Additional Passive Range of Motion Details  IR 65 degrees (R)    Strength/Myotome Testing     Right Shoulder     Planes of Motion   Flexion: 3+   Extension: 3+   Abduction: 3+   External rotation at 0°: 3+     Tests     Right Shoulder   Positive apprehension, painful arc and Speed's.   Negative  empty can and lift-off.      General Comments     Shoulder Comments   Pain with dressing/ grooming/ bathing/ reaching activities (R) UE secondary to limitations of pain/ loss of rom consistent with adhesive cap.        See Exercise, Manual, and Modality Logs for complete treatment.       Functional Outcome Score:98 spadi score        Assessment & Plan     Assessment  Impairments: abnormal gait, abnormal muscle firing, abnormal or restricted ROM, activity intolerance, impaired balance, impaired physical strength, lacks appropriate home exercise program, pain with function and safety issue  Functional Limitations: carrying objects, lifting, walking, pulling, uncomfortable because of pain, standing, stooping, reaching behind back, reaching overhead and unable to perform repetitive tasks  Assessment details: Justina Mccarty is a 56 y.o. year-old female referred to physical therapy for (R) adhesive capsulitis.  Pt reports 5/ 10 (R) shoulder pain with movement.  Pt had steroid injection (R) shoulder 2 twos ago with benefit. (+) night pain. JIA lifting;  She presents with a evolving clinical presentation.  Pt demonstrates decreased (R) should PROM in flexion 110/ 95 abduction/ IR 65 degrees;  Moderate ttp (R) bicipital groove/ supraspinatus. MMT at this time is limited 3+/5 with pain. Signs and symptoms are consistent with physical therapy diagnosis of (R) adhesive capsulitis.  Prognosis: good    Goals  Plan Goals: stg 6 wks    Pt to be educated/ independent with initial HEP    Increased (R) shoulder PROM in flexion 165/ abduction 165/ IR 75 all to allow for increased ease with dressing/ grooming / bathing activities.    Decreased bicipital groove/ supraspinatus ttp from severe to minimal to allow for increased ease with 6 hr continuous sleep.    ltg 12     Increase (R) shoulder AROM in flexion 165/ abduction 165/ IR 80 degrees all to allow for increased ease with reaching/ bathing/ dressing activities.    Increase (R)  shoulder MMT to 4/5 at 90 degrees to allow for increased ease with modified lifting tasks without evidence of pain consistently.     Improve spadi score from 98 to 48        Plan  Therapy options: will be seen for skilled therapy services  Planned modality interventions: cryotherapy, electrical stimulation/Russian stimulation, TENS, ultrasound and thermotherapy (hydrocollator packs)  Planned therapy interventions: abdominal trunk stabilization, manual therapy, motor coordination training, neuromuscular re-education, balance/weight-bearing training, ADL retraining, flexibility, functional ROM exercises, gait training, home exercise program, joint mobilization, transfer training, therapeutic activities, strengthening, stretching and soft tissue mobilization  Frequency: 2x week  Duration in weeks: 12  Treatment plan discussed with: patient        Timed:  Manual Therapy:        mins  93439;  Therapeutic Exercise:   15      mins  27603;     Neuromuscular Farrah:        mins  65856;    Therapeutic Activity:          mins  55796;     Gait Training:          mins  27982;     Ultrasound:          mins  79354;    Iontophoresis        mins 99059  Dry Needling        mins 08411/ 20561 (Self-pay)      Untimed:  Electrical Stimulation:         mins  53719 ( );  Traction:       mins  89248;   Low Eval          Mins  69419  Mod Eval          Mins  48082  High Eval                            Mins  57248    Timed Treatment:   15   mins   Total Treatment:     40   mins    PT SIGNATURE: Saul Mckeon PT     License Number: [id number]    Electronically signed by Saul Mckeon PT, 02/01/22, 1:12 PM EST    DATE TREATMENT INITIATED: 2/1/2022    Initial Certification  Certification Period: 5/2/2022  I certify that the therapy services are furnished while this patient is under my care.  The services outlined above are required by this patient, and will be reviewed every 90 days.     PHYSICIAN: Danilo Hayes Jr., MD   NPI:  [id number]                                         DATE:     Please sign and return via fax to [phone] Thank you, [facility].

## 2022-02-08 ENCOUNTER — TREATMENT (OUTPATIENT)
Dept: PHYSICAL THERAPY | Facility: CLINIC | Age: 57
End: 2022-02-08

## 2022-02-08 DIAGNOSIS — M75.01 ADHESIVE CAPSULITIS OF RIGHT SHOULDER: Primary | ICD-10-CM

## 2022-02-08 PROCEDURE — 97140 MANUAL THERAPY 1/> REGIONS: CPT | Performed by: PHYSICAL THERAPIST

## 2022-02-08 PROCEDURE — 97110 THERAPEUTIC EXERCISES: CPT | Performed by: PHYSICAL THERAPIST

## 2022-02-08 NOTE — PROGRESS NOTES
Physical Therapy Daily Progress Note    Patient: Justina Mccarty   : 1965  Diagnosis/ICD-10 Code:  Adhesive capsulitis of right shoulder [M75.01]  Referring practitioner: Leoncio Cope MD  Date of Initial Visit: Type: THERAPY  Noted: 2022  Today's Date: 2022  Patient seen for 2 sessions           Subjective may be a little better. Been working on things Ridge talked to me about    Objective          Passive Range of Motion     Right Shoulder   Flexion: 118 degrees   External rotation 45°: 50 degrees       See Exercise, Manual, and Modality Logs for complete treatment.     Exercises:  -table slides, 10x 10 sec  -ER stretch propped on table 10x 10 sec  -pulleys, flex and scapt, 10x 10 sec  -AAROM ER in supine w/cane, 10x 10 sec    Manual:  PROM in flexion, scaption, and ER, grade 3 inferior joint mobs to R shoulder, oscillations for pain control, STM to R UT, levator, and pect lift    Assessment/Plan  Addition of table slides and AAROM ER with cane. Also had patient start on pulleys, she is going to get some for home. Some improvement in her passive flexion.          Timed:    Manual Therapy:    20     mins  60172;  Therapeutic Exercise:    23     mins  40005;     Neuromuscular Farrah:        mins  63123;    Therapeutic Activity:          mins  99657;     Gait Training:           mins  05348;     Ultrasound:          mins  88206;    Electrical Stimulation:         mins  24346 ( );  Iontophoresis         mins 87610;  Aquatic Therapy         mins 88212;  Dry Needling                   mins 92665/ 40326 (Self-pay)    Untimed:  Electrical Stimulation:         mins  17386 ( );  Traction:         mins  06811;     Timed Treatment:   43   mins   Total Treatment:     43   mins    Rizwana Cedeno, PT  Physical Therapist    [place] License:[id number]

## 2022-02-11 ENCOUNTER — TREATMENT (OUTPATIENT)
Dept: PHYSICAL THERAPY | Facility: CLINIC | Age: 57
End: 2022-02-11

## 2022-02-11 DIAGNOSIS — M75.01 ADHESIVE CAPSULITIS OF RIGHT SHOULDER: Primary | ICD-10-CM

## 2022-02-11 PROCEDURE — 97110 THERAPEUTIC EXERCISES: CPT | Performed by: PHYSICAL THERAPIST

## 2022-02-11 PROCEDURE — 97140 MANUAL THERAPY 1/> REGIONS: CPT | Performed by: PHYSICAL THERAPIST

## 2022-02-11 NOTE — PROGRESS NOTES
Physical Therapy Daily Progress Note    Patient: Justina Mccarty   : 1965  Diagnosis/ICD-10 Code:  Adhesive capsulitis of right shoulder [M75.01]  Referring practitioner: Leoncio Cope MD  Date of Initial Visit: Type: THERAPY  Noted: 2022  Today's Date: 2022  Patient seen for 3 sessions           Subjective I was sore after the last session. It is worse in the am    Objective       Exercises:  -table slides, 10x 10 sec  -ER stretch propped on table 10x 10 sec  -pulleys, flex and scapt, 10x 10 sec  -AAROM ER in supine w/cane, 10x 10 sec     Manual:  PROM in flexion, scaption, and ER, grade 3 inferior joint mobs to R shoulder, oscillations for pain control, STM to R UT, levator, and pect lift    Assessment/Plan  Pt did take ibuprofen this am, but was really guarded and sore with her PROM.  Tried to move her into IR, but it was very painful. She will continue to work on stretching throughout the day, ice PRN.          Timed:    Manual Therapy:    15     mins  78429;  Therapeutic Exercise:    25     mins  40025;     Neuromuscular Farrah:        mins  21844;    Therapeutic Activity:          mins  16583;     Gait Training:           mins  08544;     Ultrasound:          mins  92283;    Electrical Stimulation:         mins  35452 ( );  Iontophoresis         mins 82885;  Aquatic Therapy         mins 34638;  Dry Needling                   mins 82689/  (Self-pay)    Untimed:  Electrical Stimulation:         mins  65910 ( );  Traction:         mins  89958;     Timed Treatment:   40   mins   Total Treatment:     40   mins    Rizwana Cedeno, PT  Physical Therapist    [place] License:[id number]

## 2022-02-14 ENCOUNTER — TREATMENT (OUTPATIENT)
Dept: PHYSICAL THERAPY | Facility: CLINIC | Age: 57
End: 2022-02-14

## 2022-02-14 DIAGNOSIS — M75.01 ADHESIVE CAPSULITIS OF RIGHT SHOULDER: Primary | ICD-10-CM

## 2022-02-14 PROCEDURE — 97140 MANUAL THERAPY 1/> REGIONS: CPT | Performed by: PHYSICAL THERAPIST

## 2022-02-14 PROCEDURE — 97110 THERAPEUTIC EXERCISES: CPT | Performed by: PHYSICAL THERAPIST

## 2022-02-14 NOTE — PROGRESS NOTES
Physical Therapy Daily Progress Note    Patient: Justina Mccarty   : 1965  Diagnosis/ICD-10 Code:  Adhesive capsulitis of right shoulder [M75.01]  Referring practitioner: Leoncio Cope MD  Date of Initial Visit: Type: THERAPY  Noted: 2022  Today's Date: 2022  Patient seen for 4 sessions           Subjective sore for stretching over the weekend    Objective          Passive Range of Motion     Right Shoulder   Flexion: 130 degrees with pain  Abduction: 105 degrees with pain  External rotation 45°: 47 degrees with pain  Internal rotation 45°: 50 degrees with pain        Exercises:  -table slides, 10x 10 sec  -ER stretch propped on table 10x 10 sec  -pulleys, flex and scapt, 10x 10 sec  -AAROM ER in supine w/cane, 10x 10 sec (cues for form), lead with thumb, keep elbow bent to 90 deg     Manual:  PROM in flexion, scaption, and ER, grade 3 inferior joint mobs to R shoulder, oscillations for pain control, STM to R UT, levator, and pect lift    Assessment/Plan  Pt is making progress with her ROM. She is still very painful at the end of range with a capsular pattern. She is going to order pulleys for home this week.          Timed:    Manual Therapy:    20     mins  97261;  Therapeutic Exercise:    23     mins  55806;     Neuromuscular Farrah:        mins  21877;    Therapeutic Activity:          mins  02654;     Gait Training:           mins  74144;     Ultrasound:          mins  38964;    Electrical Stimulation:         mins  36893 ( );  Iontophoresis         mins 75924;  Aquatic Therapy        mins 76811;  Dry Needling                   mins 87816/  (Self-pay)    Untimed:  Electrical Stimulation:         mins  81443 ( );  Traction:         mins  83015;     Timed Treatment:   43   mins   Total Treatment:     43   mins    Rizwana Cedeno, PT  Physical Therapist    [place] License:[id number]

## 2022-02-23 ENCOUNTER — TREATMENT (OUTPATIENT)
Dept: PHYSICAL THERAPY | Facility: CLINIC | Age: 57
End: 2022-02-23

## 2022-02-23 DIAGNOSIS — M75.01 ADHESIVE CAPSULITIS OF RIGHT SHOULDER: Primary | ICD-10-CM

## 2022-02-23 PROCEDURE — 97110 THERAPEUTIC EXERCISES: CPT | Performed by: PHYSICAL THERAPIST

## 2022-02-23 PROCEDURE — 97140 MANUAL THERAPY 1/> REGIONS: CPT | Performed by: PHYSICAL THERAPIST

## 2022-02-23 NOTE — PROGRESS NOTES
Physical Therapy Daily Progress Note    Patient: Justina Mccarty   : 1965  Diagnosis/ICD-10 Code:  Adhesive capsulitis of right shoulder [M75.01]  Referring practitioner: Leoncio Cope MD  Date of Initial Visit: Type: THERAPY  Noted: 2022  Today's Date: 2022  Patient seen for 5 sessions           Subjective it seems to be moving a little better    Objective          Active Range of Motion     Right Shoulder   Flexion: 127 degrees           Exercises:  -table slides, 10x 10 sec  -ER stretch propped on table 10x 10 sec  -IR stretch with towel in standing, 10x 10 sec (work towards center of back)  -AAROM ER in supine w/cane, 10x 10 sec (cues for form), lead with thumb, keep elbow bent to 90 deg  -supine punch ups     Manual:  PROM in flexion, scaption, and ER, grade 3 inferior joint mobs to R shoulder, oscillations for pain control, STM to R UT, levator, and pect lift    Assessment/Plan  Pt now able to put her phone in her back pocket some of the time and sleep on the R side. Started a home stretch for IR with a towel, cues to not be very aggressive with it         Timed:    Manual Therapy:    20     mins  51733;  Therapeutic Exercise:    23     mins  99049;     Neuromuscular Farrah:        mins  63857;    Therapeutic Activity:          mins  42583;     Gait Training:           mins  36434;     Ultrasound:          mins  72342;    Electrical Stimulation:         mins  76473 ( );  Iontophoresis         mins 37909;  Aquatic Therapy         mins 34895;  Dry Needling                   mins 38933/ 42483 (Self-pay)    Untimed:  Electrical Stimulation:         mins  88059 ( );  Traction:         mins  85236;     Timed Treatment:   43   mins   Total Treatment:     43   mins    Rizwana Cedeno, PT  Physical Therapist    [place] License:[id number]

## 2022-02-25 ENCOUNTER — TREATMENT (OUTPATIENT)
Dept: PHYSICAL THERAPY | Facility: CLINIC | Age: 57
End: 2022-02-25

## 2022-02-25 DIAGNOSIS — M75.01 ADHESIVE CAPSULITIS OF RIGHT SHOULDER: Primary | ICD-10-CM

## 2022-02-25 PROCEDURE — 97110 THERAPEUTIC EXERCISES: CPT | Performed by: PHYSICAL THERAPIST

## 2022-02-25 PROCEDURE — 97140 MANUAL THERAPY 1/> REGIONS: CPT | Performed by: PHYSICAL THERAPIST

## 2022-02-25 NOTE — PROGRESS NOTES
Physical Therapy Daily Progress Note    Patient: Justina Mccarty   : 1965  Diagnosis/ICD-10 Code:  Adhesive capsulitis of right shoulder [M75.01]  Referring practitioner: Leoncio Cope MD  Date of Initial Visit: Type: THERAPY  Noted: 2022  Today's Date: 2022  Patient seen for 6 sessions           Subjective I was really sore after the last visit.     Objective     Exercises:  -table slides, 10x 10 sec  -ER stretch propped on table 10x 10 sec  -IR stretch with towel in standing, 10x 10 sec (work towards center of back)  -AAROM ER in supine w/cane, 10x 10 sec (cues for form), lead with thumb, keep elbow bent to 90 deg  -scap ret and shoulder ext, red band, x15 (new for home)  -SL ER, towel under arm, 2x10 (new for home)  -supine punch ups 2x10 (new for home)     Manual:  PROM in flexion, scaption, and ER, grade 3 inferior joint mobs to R shoulder, oscillations for pain control, STM to R UT, levator, and pect lift    Assessment/Plan  Pt starting to get a little more mobility with household chores, such as reaching into her cabinets. She has pain at the end of her ROM and still with a capsular pattern. Added scapular and RC strengthening today to be done 3x a week         Timed:    Manual Therapy:    13     mins  95778;  Therapeutic Exercise:    30     mins  69567;     Neuromuscular Farrah:        mins  42315;    Therapeutic Activity:          mins  85302;     Gait Training:           mins  64590;     Ultrasound:          mins  52903;    Electrical Stimulation:         mins  28159 ( );  Iontophoresis         mins 22505;  Aquatic Therapy         mins 48494;  Dry Needling                   mins 60654/  (Self-pay)    Untimed:  Electrical Stimulation:         mins  53506 ( );  Traction:         mins  58958;     Timed Treatment:   43   mins   Total Treatment:     43   mins    Rizwana Cedeno PT, CDNT  Physical Therapist    [place] License:[id number]

## 2022-03-01 ENCOUNTER — TREATMENT (OUTPATIENT)
Dept: PHYSICAL THERAPY | Facility: CLINIC | Age: 57
End: 2022-03-01

## 2022-03-01 DIAGNOSIS — M75.01 ADHESIVE CAPSULITIS OF RIGHT SHOULDER: Primary | ICD-10-CM

## 2022-03-01 PROCEDURE — 97110 THERAPEUTIC EXERCISES: CPT | Performed by: PHYSICAL THERAPIST

## 2022-03-01 PROCEDURE — 97140 MANUAL THERAPY 1/> REGIONS: CPT | Performed by: PHYSICAL THERAPIST

## 2022-03-01 NOTE — PROGRESS NOTES
30-Day / 10-Visit Progress Note         Patient: Justina Mccarty   : 1965  Diagnosis/ICD-10 Code:  Adhesive capsulitis of right shoulder [M75.01]  Referring practitioner: Leoncio Cope MD  Date of Initial Visit: Type: THERAPY  Noted: 2022  Today's Date: 3/1/2022  Patient seen for 7 sessions      Subjective:     Clinical Progress: improved  Home Program Compliance: Yes  Treatment has included:  therapeutic exercise, manual therapy and patient education with home exercise program     Subjective feel like this week it is starting to improve, pain no more than 5/10  Objective          Active Range of Motion     Right Shoulder   Flexion: 130 degrees   Abduction: 95 degrees   External rotation BTH: C2   Internal rotation BTB: sacrum     Passive Range of Motion     Right Shoulder   Flexion: 130 degrees with pain  Abduction: 105 degrees with pain  External rotation 45°: 68 degrees with pain  Internal rotation 45°: 65 degrees with pain        Exercises:  -table slides, 10x 10 sec  -ER stretch propped on table 10x 10 sec  -IR stretch with towel in standing, 10x 10 sec (work towards center of back)  -AAROM ER in supine w/cane, 10x 10 sec (cues for form), lead with thumb, keep elbow bent to 90 deg     Manual:  PROM in flexion, scaption, and ER, grade 3 inferior joint mobs to R shoulder, oscillations for pain control, STM to R UT, levator, and pect lift    Functional Outcome Score:   SPADI=77/130 or 59% disability    Assessment & Plan     Assessment    Assessment details: Justina Mccarty has been seen for 7 physical therapy sessions for R shoulder pain with adhesive capsulitis.  Treatment has included therapeutic exercise, manual therapy and patient education with home exercise program . Progress to physical therapy goals is fair. She is now has a pulley set for home to work on her ROM. She is making gains, but it is painful and slow. She is starting to notice greater ease with grooming and some with dressing, still  unable to sleep on her R side.  She will benefit from continued skilled physical therapy to address remaining impairments and functional limitations.     Prognosis: good    Goals  Plan Goals: stg 6 wks    Pt to be educated/ independent with initial HEP (MET)     Increased (R) shoulder PROM in flexion 165/ abduction 165/ IR 75 all to allow for increased ease with dressing/ grooming / bathing activities. (ONGOING)     Decreased bicipital groove/ supraspinatus ttp from severe to minimal to allow for increased ease with 6 hr continuous sleep. (ONGOING) still waking up at night, not every night    ltg 12     Increase (R) shoulder AROM in flexion 165/ abduction 165/ IR 80 degrees all to allow for increased ease with reaching/ bathing/ dressing activities. (ONGOING)     Increase (R) shoulder MMT to 4/5 at 90 degrees to allow for increased ease with modified lifting tasks without evidence of pain consistently. (ONGOING)     Improve spadi score from 98 to 48 (ONGOING) improved to 77/130    Plan  Therapy options: will be seen for skilled therapy services  Frequency: 2x week  Duration in weeks: 8  Treatment plan discussed with: patient           Recommendations: Continue as planned  Timeframe: 2 months  Prognosis to achieve goals: good    PT Signature: Rizwana Cedeno, PT, CDNT    License Number: [id number]    Electronically signed by Rizwana Cedeno PT, 03/01/22, 7:35 AM EST      Based upon review of the patient's progress and continued therapy plan, it is my medical opinion that Justina Mccarty should continue physical therapy treatment at [facility]  [address] [place] [address] [address] [place] [address]  [phone].    Signature: __________________________________  Leoncio Cope MD    Timed:  Manual Therapy:    20     mins  69884;  Therapeutic Exercise:    23     mins  71231;     Neuromuscular Farrah:        mins  68938;    Therapeutic Activity:          mins  58723;     Gait  Training:           mins  38220;     Ultrasound:          mins  69908;    Iontophoresis         mins 26411;  Dry Needling                   mins 20560/20561 (Self-pay)    Untimed:  Electrical Stimulation:         mins  60939 ( );  Traction:         mins  91439;     Timed Treatment:   43   mins   Total Treatment:     43   mins

## 2022-03-07 ENCOUNTER — TREATMENT (OUTPATIENT)
Dept: PHYSICAL THERAPY | Facility: CLINIC | Age: 57
End: 2022-03-07

## 2022-03-07 DIAGNOSIS — M75.01 ADHESIVE CAPSULITIS OF RIGHT SHOULDER: Primary | ICD-10-CM

## 2022-03-07 PROCEDURE — 97140 MANUAL THERAPY 1/> REGIONS: CPT | Performed by: PHYSICAL THERAPIST

## 2022-03-07 PROCEDURE — 97110 THERAPEUTIC EXERCISES: CPT | Performed by: PHYSICAL THERAPIST

## 2022-03-07 NOTE — PROGRESS NOTES
Physical Therapy Daily Progress Note    Patient: Justina Mccarty   : 1965  Diagnosis/ICD-10 Code:  Adhesive capsulitis of right shoulder [M75.01]  Referring practitioner: Leoncio Cope MD  Date of Initial Visit: Type: THERAPY  Noted: 2022  Today's Date: 3/7/2022  Patient seen for 8 sessions           Subjective I had a head cold end of last week. Not up to doing much PT. Feeling better. now    Objective   See Exercise, Manual, and Modality Logs for complete treatment.       Assessment/Plan  Progressed with (R) GH mobs;  Strong emphasis on PROM / AAROM per pt tolerance.  (R) shoulder flexion 160 flexion/ 150 in ab.   Encouraged compliance with HEP.            Timed:    Manual Therapy:   8    mins  04385;  Therapeutic Exercise:   25     mins  82585;     Neuromuscular Farrah:        mins  99385;    Therapeutic Activity:          mins  52960;     Gait Training:           mins  06803;     Ultrasound:          mins  92629;    Electrical Stimulation:         mins  14331 ( );  Iontophoresis         mins 47408;  Aquatic Therapy         mins 52320;  Dry Needling                   mins 47161/  (Self-pay)    Untimed:  Electrical Stimulation:         mins  98267 ( );  Traction:         mins  09742;     Timed Treatment:   33   mins   Total Treatment:     33   mins    Saul Mckeon, PT  Physical Therapist    [place] License: [id number]  
DC instructions

## 2022-03-10 ENCOUNTER — TREATMENT (OUTPATIENT)
Dept: PHYSICAL THERAPY | Facility: CLINIC | Age: 57
End: 2022-03-10

## 2022-03-10 DIAGNOSIS — M75.01 ADHESIVE CAPSULITIS OF RIGHT SHOULDER: Primary | ICD-10-CM

## 2022-03-10 PROCEDURE — 97110 THERAPEUTIC EXERCISES: CPT | Performed by: PHYSICAL THERAPIST

## 2022-03-11 NOTE — PROGRESS NOTES
30-Day / 10-Visit Progress Note         Patient: Justina Mccarty   : 1965  Diagnosis/ICD-10 Code:  Adhesive capsulitis of right shoulder [M75.01]  Referring practitioner: Leoncio Cope MD  Date of Initial Visit: Type: THERAPY  Noted: 2022  Today's Date: 3/11/2022  Patient seen for 9 sessions      Subjective:     Clinical Progress: unchanged  Home Program Compliance: Yes  Treatment has included:  therapeutic exercise    Subjective Evaluation    History of Present Illness    Subjective comment: shoulder is very sore today.  i may used it too much at workPain  Current pain ratin  At best pain ratin  At worst pain ratin  Quality: dull ache, knife-like, pulling, squeezing, tight and radiating    Patient Goals  Patient goals for therapy: increased strength, independence with ADLs/IADLs, return to sport/leisure activities, return to work, decreased pain and increased motion         Objective     See Exercise, Manual, and Modality Logs for complete treatment.         Assessment & Plan     Assessment  Impairments: abnormal gait, abnormal muscle firing, abnormal muscle tone, abnormal or restricted ROM, activity intolerance, impaired balance, impaired physical strength, pain with function and safety issue  Functional Limitations: walking, uncomfortable because of pain, standing and stooping  Assessment details: Justina Mccarty has been seen for 8 physical therapy sessions for R shoulder pain with adhesive capsulitis.  Treatment has included therapeutic exercise, manual therapy and patient education with home exercise program . Progress to physical therapy goals is fair. She is now has a pulley set for home to work on her ROM. (R) shoulder PROM 135 degrees in flexion/ 125 in abduction with acute (R) shoulder pain.  MMT scaption 4-/5, ER 4-/5 , ab 3+/5, biceps 3+/5;  (+) speeds ;  Pt very sensitive with PROM/ AAROM 6-7/10 pain.  Pt notes mild increase in ADL tolerance.(+) night pain is noted.  Pt follows up  with orthopedics next week to determine appropriate POC. She will benefit from continued skilled physical therapy to address remaining impairments and functional limitations.     Prognosis: good   Goals  Plan Goals: stg 6 wks    Pt to be educated/ independent with initial HEP (MET)     Increased (R) shoulder PROM in flexion 165/ abduction 165/ IR 75 all to allow for increased ease with dressing/ grooming / bathing activities. (ONGOING)     Decreased bicipital groove/ supraspinatus ttp from severe to minimal to allow for increased ease with 6 hr continuous sleep. (ONGOING) still waking up at night, not every night    ltg 12     Increase (R) shoulder AROM in flexion 165/ abduction 165/ IR 80 degrees all to allow for increased ease with reaching/ bathing/ dressing activities. (ONGOING)     Increase (R) shoulder MMT to 4/5 at 90 degrees to allow for increased ease with modified lifting tasks without evidence of pain consistently. (ONGOING)     Improve spadi score from 98 to 48 (ONGOING) improved to 77/130  Prognosis: good    Goals  Plan Goals: Goals  Plan Goals: stg 6 wks    Pt to be educated/ independent with initial HEP (MET)     Increased (R) shoulder PROM in flexion 165/ abduction 165/ IR 75 all to allow for increased ease with dressing/ grooming / bathing activities. (ONGOING)     Decreased bicipital groove/ supraspinatus ttp from severe to minimal to allow for increased ease with 6 hr continuous sleep. (ONGOING) still waking up at night, not every night    ltg 12     Increase (R) shoulder AROM in flexion 165/ abduction 165/ IR 80 degrees all to allow for increased ease with reaching/ bathing/ dressing activities. (ONGOING)     Increase (R) shoulder MMT to 4/5 at 90 degrees to allow for increased ease with modified lifting tasks without evidence of pain consistently. (ONGOING)     Improve spadi score from 98 to 48 (ONGOING) improved to 77/130    Plan  Therapy options: will be seen for skilled therapy  services  Planned modality interventions: cryotherapy, electrical stimulation/Russian stimulation, TENS, ultrasound and thermotherapy (hydrocollator packs)  Planned therapy interventions: abdominal trunk stabilization, manual therapy, motor coordination training, neuromuscular re-education, balance/weight-bearing training, ADL retraining, flexibility, functional ROM exercises, gait training, home exercise program, joint mobilization, transfer training, therapeutic activities, strengthening, stretching and soft tissue mobilization  Frequency: 2x week  Duration in weeks: 12  Treatment plan discussed with: patient           Recommendations: Continue as planned  Timeframe: 1 month  Prognosis to achieve goals: good    PT Signature: Saul Mckeon PT    License Number: [id number]    Electronically signed by Saul Mckeon PT, 03/11/22, 7:06 AM EST      Based upon review of the patient's progress and continued therapy plan, it is my medical opinion that Justina Mccarty should continue physical therapy treatment at [facility]  [address] [place] [address] [address] [address]  [phone].    Signature: __________________________________  Leoncio Cope MD    Timed:  Manual Therapy:         mins  95135;  Therapeutic Exercise:    40     mins  07784;     Neuromuscular Farrah:        mins  85371;    Therapeutic Activity:          mins  60148;     Gait Training:           mins  82988;     Ultrasound:          mins  54886;    Iontophoresis         mins 36324;  Dry Needling                   mins 45831/87159 (Self-pay)    Untimed:  Electrical Stimulation:         mins  86645 ( );  Traction:         mins  47180;     Timed Treatment:      40mins   Total Treatment:     40   mins

## 2022-03-25 ENCOUNTER — LAB (OUTPATIENT)
Dept: LAB | Facility: HOSPITAL | Age: 57
End: 2022-03-25

## 2022-03-25 ENCOUNTER — TRANSCRIBE ORDERS (OUTPATIENT)
Dept: ADMINISTRATIVE | Facility: HOSPITAL | Age: 57
End: 2022-03-25

## 2022-03-25 ENCOUNTER — HOSPITAL ENCOUNTER (OUTPATIENT)
Dept: CARDIOLOGY | Facility: HOSPITAL | Age: 57
Discharge: HOME | End: 2022-03-25

## 2022-03-25 ENCOUNTER — TRANSCRIBE ORDERS (OUTPATIENT)
Dept: CARDIOLOGY | Facility: HOSPITAL | Age: 57
End: 2022-03-25

## 2022-03-25 ENCOUNTER — HOSPITAL ENCOUNTER (OUTPATIENT)
Dept: GENERAL RADIOLOGY | Facility: HOSPITAL | Age: 57
Discharge: HOME | End: 2022-03-25

## 2022-03-25 DIAGNOSIS — M25.511 RIGHT SHOULDER PAIN, UNSPECIFIED CHRONICITY: ICD-10-CM

## 2022-03-25 DIAGNOSIS — M25.511 RIGHT SHOULDER PAIN, UNSPECIFIED CHRONICITY: Primary | ICD-10-CM

## 2022-03-25 DIAGNOSIS — Z01.818 PREOP EXAMINATION: ICD-10-CM

## 2022-03-25 DIAGNOSIS — Z01.811 PRE-OP CHEST EXAM: Primary | ICD-10-CM

## 2022-03-25 LAB
ALBUMIN SERPL-MCNC: 4.6 G/DL (ref 3.5–5.2)
ALBUMIN/GLOB SERPL: 1.7 G/DL
ALP SERPL-CCNC: 57 U/L (ref 39–117)
ALT SERPL W P-5'-P-CCNC: 14 U/L (ref 1–33)
ANION GAP SERPL CALCULATED.3IONS-SCNC: 9 MMOL/L (ref 5–15)
AST SERPL-CCNC: 18 U/L (ref 1–32)
BILIRUB SERPL-MCNC: 0.3 MG/DL (ref 0–1.2)
BUN SERPL-MCNC: 17 MG/DL (ref 6–20)
BUN/CREAT SERPL: 20.5 (ref 7–25)
CALCIUM SPEC-SCNC: 9.2 MG/DL (ref 8.6–10.5)
CHLORIDE SERPL-SCNC: 106 MMOL/L (ref 98–107)
CO2 SERPL-SCNC: 27 MMOL/L (ref 22–29)
CREAT SERPL-MCNC: 0.83 MG/DL (ref 0.57–1)
DEPRECATED RDW RBC AUTO: 47 FL (ref 37–54)
EGFRCR SERPLBLD CKD-EPI 2021: 81.8 ML/MIN/1.73
ERYTHROCYTE [DISTWIDTH] IN BLOOD BY AUTOMATED COUNT: 13.1 % (ref 12.3–15.4)
GLOBULIN UR ELPH-MCNC: 2.7 GM/DL
GLUCOSE SERPL-MCNC: 86 MG/DL (ref 65–99)
HCT VFR BLD AUTO: 43.5 % (ref 34–46.6)
HGB BLD-MCNC: 14 G/DL (ref 12–15.9)
MCH RBC QN AUTO: 31 PG (ref 26.6–33)
MCHC RBC AUTO-ENTMCNC: 32.2 G/DL (ref 31.5–35.7)
MCV RBC AUTO: 96.2 FL (ref 79–97)
PLATELET # BLD AUTO: 273 10*3/MM3 (ref 140–450)
PMV BLD AUTO: 10.5 FL (ref 6–12)
POTASSIUM SERPL-SCNC: 4.4 MMOL/L (ref 3.5–5.2)
PROT SERPL-MCNC: 7.3 G/DL (ref 6–8.5)
QT INTERVAL: 350 MS
RBC # BLD AUTO: 4.52 10*6/MM3 (ref 3.77–5.28)
SODIUM SERPL-SCNC: 142 MMOL/L (ref 136–145)
WBC NRBC COR # BLD: 6.52 10*3/MM3 (ref 3.4–10.8)

## 2022-03-25 PROCEDURE — 85027 COMPLETE CBC AUTOMATED: CPT

## 2022-03-25 PROCEDURE — 71046 X-RAY EXAM CHEST 2 VIEWS: CPT

## 2022-03-25 PROCEDURE — 93010 ELECTROCARDIOGRAM REPORT: CPT | Performed by: INTERNAL MEDICINE

## 2022-03-25 PROCEDURE — 36415 COLL VENOUS BLD VENIPUNCTURE: CPT

## 2022-03-25 PROCEDURE — 80053 COMPREHEN METABOLIC PANEL: CPT

## 2022-03-25 PROCEDURE — 93005 ELECTROCARDIOGRAM TRACING: CPT

## 2022-07-07 ENCOUNTER — DOCUMENTATION (OUTPATIENT)
Dept: PHYSICAL THERAPY | Facility: CLINIC | Age: 57
End: 2022-07-07

## 2022-07-07 DIAGNOSIS — M75.01 ADHESIVE CAPSULITIS OF RIGHT SHOULDER: Primary | ICD-10-CM

## 2022-07-07 NOTE — PROGRESS NOTES
Justina MAC [place] was seen for 9 physical therapy sessions for R shoulder pain with adhesive capsulitis.  Treatment included therapeutic exercise, manual therapy, neuro-muscular retraining  and patient education with home exercise program . Progress to physical therapy goals was slow.  She was discharged to follow up with her MD, to an independent [facility], and provided patient education to self-manage condition.

## 2023-01-04 ENCOUNTER — TRANSCRIBE ORDERS (OUTPATIENT)
Dept: ADMINISTRATIVE | Facility: HOSPITAL | Age: 58
End: 2023-01-04
Payer: COMMERCIAL

## 2023-01-04 ENCOUNTER — LAB (OUTPATIENT)
Dept: LAB | Facility: HOSPITAL | Age: 58
End: 2023-01-04
Payer: COMMERCIAL

## 2023-01-04 ENCOUNTER — HOSPITAL ENCOUNTER (OUTPATIENT)
Dept: GENERAL RADIOLOGY | Facility: HOSPITAL | Age: 58
Discharge: HOME | End: 2023-01-04
Payer: COMMERCIAL

## 2023-01-04 DIAGNOSIS — U07.1 CLINICAL DIAGNOSIS OF COVID-19: ICD-10-CM

## 2023-01-04 DIAGNOSIS — U07.1 CLINICAL DIAGNOSIS OF COVID-19: Primary | ICD-10-CM

## 2023-01-04 DIAGNOSIS — U07.1 COVID: ICD-10-CM

## 2023-01-04 LAB
BASOPHILS # BLD AUTO: 0.02 10*3/MM3 (ref 0–0.2)
BASOPHILS NFR BLD AUTO: 0.3 % (ref 0–1.5)
D DIMER PPP FEU-MCNC: 0.49 MCGFEU/ML (ref 0–0.59)
DEPRECATED RDW RBC AUTO: 42.1 FL (ref 37–54)
EOSINOPHIL # BLD AUTO: 0.04 10*3/MM3 (ref 0–0.4)
EOSINOPHIL NFR BLD AUTO: 0.7 % (ref 0.3–6.2)
ERYTHROCYTE [DISTWIDTH] IN BLOOD BY AUTOMATED COUNT: 12.5 % (ref 12.3–15.4)
HCT VFR BLD AUTO: 43.7 % (ref 34–46.6)
HGB BLD-MCNC: 14.1 G/DL (ref 12–15.9)
IMM GRANULOCYTES # BLD AUTO: 0.06 10*3/MM3 (ref 0–0.05)
IMM GRANULOCYTES NFR BLD AUTO: 1 % (ref 0–0.5)
LYMPHOCYTES # BLD AUTO: 1.6 10*3/MM3 (ref 0.7–3.1)
LYMPHOCYTES NFR BLD AUTO: 26.5 % (ref 19.6–45.3)
MCH RBC QN AUTO: 29.8 PG (ref 26.6–33)
MCHC RBC AUTO-ENTMCNC: 32.3 G/DL (ref 31.5–35.7)
MCV RBC AUTO: 92.4 FL (ref 79–97)
MONOCYTES # BLD AUTO: 0.33 10*3/MM3 (ref 0.1–0.9)
MONOCYTES NFR BLD AUTO: 5.5 % (ref 5–12)
NEUTROPHILS NFR BLD AUTO: 3.99 10*3/MM3 (ref 1.7–7)
NEUTROPHILS NFR BLD AUTO: 66 % (ref 42.7–76)
NRBC BLD AUTO-RTO: 0 /100 WBC (ref 0–0.2)
PLATELET # BLD AUTO: 283 10*3/MM3 (ref 140–450)
PMV BLD AUTO: 10.1 FL (ref 6–12)
RBC # BLD AUTO: 4.73 10*6/MM3 (ref 3.77–5.28)
WBC NRBC COR # BLD: 6.04 10*3/MM3 (ref 3.4–10.8)

## 2023-01-04 PROCEDURE — 71046 X-RAY EXAM CHEST 2 VIEWS: CPT

## 2023-01-04 PROCEDURE — 36415 COLL VENOUS BLD VENIPUNCTURE: CPT

## 2023-01-04 PROCEDURE — 85379 FIBRIN DEGRADATION QUANT: CPT

## 2023-01-04 PROCEDURE — 85025 COMPLETE CBC W/AUTO DIFF WBC: CPT

## 2023-03-22 ENCOUNTER — TRANSCRIBE ORDERS (OUTPATIENT)
Dept: ADMINISTRATIVE | Facility: HOSPITAL | Age: 58
End: 2023-03-22
Payer: COMMERCIAL

## 2023-03-22 DIAGNOSIS — R10.9 ABDOMINAL PAIN, UNSPECIFIED ABDOMINAL LOCATION: Primary | ICD-10-CM

## 2023-03-31 ENCOUNTER — HOSPITAL ENCOUNTER (OUTPATIENT)
Dept: CT IMAGING | Facility: HOSPITAL | Age: 58
Discharge: HOME | End: 2023-03-31
Admitting: INTERNAL MEDICINE
Payer: COMMERCIAL

## 2023-03-31 DIAGNOSIS — R10.9 ABDOMINAL PAIN, UNSPECIFIED ABDOMINAL LOCATION: ICD-10-CM

## 2023-03-31 PROCEDURE — 74177 CT ABD & PELVIS W/CONTRAST: CPT

## 2023-03-31 PROCEDURE — 25510000001 IOPAMIDOL 61 % SOLUTION: Performed by: INTERNAL MEDICINE

## 2023-03-31 RX ADMIN — IOPAMIDOL 85 ML: 612 INJECTION, SOLUTION INTRAVENOUS at 09:41

## 2024-07-30 ENCOUNTER — APPOINTMENT (OUTPATIENT)
Dept: WOMENS IMAGING | Facility: HOSPITAL | Age: 59
End: 2024-07-30
Payer: COMMERCIAL

## 2024-07-30 PROCEDURE — 77065 DX MAMMO INCL CAD UNI: CPT | Performed by: RADIOLOGY

## 2024-07-30 PROCEDURE — 76642 ULTRASOUND BREAST LIMITED: CPT | Performed by: RADIOLOGY

## 2024-07-30 PROCEDURE — 77061 BREAST TOMOSYNTHESIS UNI: CPT | Performed by: RADIOLOGY

## 2024-07-30 PROCEDURE — G0279 TOMOSYNTHESIS, MAMMO: HCPCS | Performed by: RADIOLOGY

## 2025-06-04 ENCOUNTER — HOSPITAL ENCOUNTER (OUTPATIENT)
Dept: GENERAL RADIOLOGY | Facility: HOSPITAL | Age: 60
Discharge: HOME | End: 2025-06-04
Admitting: INTERNAL MEDICINE
Payer: COMMERCIAL

## 2025-06-04 DIAGNOSIS — M79.604 RIGHT LEG PAIN: ICD-10-CM

## 2025-06-04 PROCEDURE — 73590 X-RAY EXAM OF LOWER LEG: CPT
